# Patient Record
Sex: MALE | NOT HISPANIC OR LATINO | URBAN - METROPOLITAN AREA
[De-identification: names, ages, dates, MRNs, and addresses within clinical notes are randomized per-mention and may not be internally consistent; named-entity substitution may affect disease eponyms.]

---

## 2017-10-07 ENCOUNTER — INPATIENT (INPATIENT)
Facility: HOSPITAL | Age: 52
LOS: 5 days | Discharge: HOME HEALTH | End: 2017-10-13
Attending: HOSPITALIST | Admitting: HOSPITALIST
Payer: COMMERCIAL

## 2017-10-07 VITALS
HEART RATE: 76 BPM | OXYGEN SATURATION: 100 % | TEMPERATURE: 98 F | SYSTOLIC BLOOD PRESSURE: 156 MMHG | RESPIRATION RATE: 18 BRPM | DIASTOLIC BLOOD PRESSURE: 99 MMHG

## 2017-10-07 RX ORDER — MORPHINE SULFATE 50 MG/1
8 CAPSULE, EXTENDED RELEASE ORAL ONCE
Qty: 0 | Refills: 0 | Status: DISCONTINUED | OUTPATIENT
Start: 2017-10-07 | End: 2017-10-07

## 2017-10-07 RX ADMIN — MORPHINE SULFATE 8 MILLIGRAM(S): 50 CAPSULE, EXTENDED RELEASE ORAL at 23:12

## 2017-10-07 NOTE — ED PROVIDER NOTE - OBJECTIVE STATEMENT
This patient is a 52y male w PMHx RA presenting with right wrist swelling, erythema, and pain for the last 2 days. He received a steroid injection on 3 days ago from his rheumatologist. The following day the wrist began to be slightly painful, and since then has become swollen, red, warm, and increasingly painful. He has significantly decreased ROM in both flexion and extension at the wrist. He reports chills at home, no fevers, no n/v/d, headache, no belly pain. This patient is a 52y male w PMHx RA presenting with right wrist swelling, erythema, and pain for the last 2 days. He received a steroid injection on 3 days ago from his rheumatologist. The following day the wrist began to be slightly painful, and since then has become swollen, red, warm, and increasingly painful. He has significantly decreased ROM in both flexion and extension at the wrist. He reports chills at home, no fevers, no n/v/d, headache, no belly pain.    Phil att: 52M h/o RA This patient is a 52y male w PMHx RA presenting with right wrist swelling, erythema, and pain for the last 2 days. He received a steroid injection on 3 days ago from his rheumatologist. The following day the wrist began to be slightly painful, and since then has become swollen, red, warm, and increasingly painful. He has significantly decreased ROM in both flexion and extension at the wrist. He reports chills at home, no fevers, no n/v/d, headache, no belly pain.    00:00 Phil att: 52M h/o HTN, RA 3 days s/p rt wrist injection c/o rt wrist swelling and pain. Patient has h/o RA. Three days ago rec'd cortisol inject to right wrist. Two days ago gradual onset site pain, swelling, redness. Denies objective fever, chills or nausea. Denies abx. Flexes wrist with pain. NKDA PMH as above PSH x MED bp meds PCP Jarad in [place]

## 2017-10-07 NOTE — ED PROVIDER NOTE - MEDICAL DECISION MAKING DETAILS
Rt wrist pain, arthrocentesis attempted neg fluid for aspiration. DDX gout versus early septic arthritis PLAN hand c/s, cbc, cmp, case d/w Hand sx. Hand request CDU for steroids, abx, and will see patient in AM

## 2017-10-07 NOTE — ED ADULT TRIAGE NOTE - CHIEF COMPLAINT QUOTE
Pt walk in c/o Pain on right Wrist with Swelling Redness started today approx. 4pm. Pt Dx with Psoriatic Arthritis last Steroid shot was Wednesday. Called his Rheumatologist to go to ED for further eval. Did not take any pain medication claimed they don't work. Pt looks uncomfortable in Triage.

## 2017-10-07 NOTE — ED PROVIDER NOTE - ATTENDING CONTRIBUTION TO CARE
Dr. Villarreal: I have personally seen and examined this patient at the bedside. I have fully participated in the care of this patient. I have reviewed all pertinent clinical information, including history, physical exam, plan and the Resident's note and agree except as noted. HPI above as by me. PE above as by me. Rt wrist pain, arthrocentesis attempted neg fluid for aspiration. DDX gout versus early septic arthritis PLAN hand c/s, cbc, cmp, case d/w Hand sx. Hand request CDU for steroids, abx, and will see patient in AM.

## 2017-10-07 NOTE — ED ADULT NURSE NOTE - OBJECTIVE STATEMENT
Pt rec'd A&Ox3 c/o rt wrist pain started today @ 3pm. Strong b/l radial pulse. Erythema , warmth, and swelling observed on rt hand on exam. Pt has Hx of RA. States takes methotrexate for RA and receives steroid injections. IV placed LAC #20 g

## 2017-10-07 NOTE — ED PROVIDER NOTE - PROGRESS NOTE DETAILS
Phil att: Arthrocentesis attempted, neg fluid for aspiration. Case d/w Hand. Hand Surgery request CDU.

## 2017-10-07 NOTE — ED PROVIDER NOTE - PHYSICAL EXAMINATION
R wrist edematous, erythematous, warm to touch, and very tender to palpation. Loss of ROM at wrist. R wrist edematous, erythematous, warm to touch, and very tender to palpation. Loss of ROM at wrist.  01:11 Villarreal att: nad, aaox3, ctabl, rrr, s1s2, abd soft ntnd. Rt wrist: dorsal 2cm x 2cm swelling, neg warmth, active ROM limited by pain, passive ROM intact. Cap refill intact x 5 digits, sensation intact x 5 digits.

## 2017-10-08 DIAGNOSIS — I10 ESSENTIAL (PRIMARY) HYPERTENSION: ICD-10-CM

## 2017-10-08 DIAGNOSIS — A41.9 SEPSIS, UNSPECIFIED ORGANISM: ICD-10-CM

## 2017-10-08 DIAGNOSIS — L40.50 ARTHROPATHIC PSORIASIS, UNSPECIFIED: ICD-10-CM

## 2017-10-08 DIAGNOSIS — Z29.9 ENCOUNTER FOR PROPHYLACTIC MEASURES, UNSPECIFIED: ICD-10-CM

## 2017-10-08 DIAGNOSIS — M25.531 PAIN IN RIGHT WRIST: ICD-10-CM

## 2017-10-08 LAB
ALBUMIN SERPL ELPH-MCNC: 4.2 G/DL — SIGNIFICANT CHANGE UP (ref 3.3–5)
ALP SERPL-CCNC: 80 U/L — SIGNIFICANT CHANGE UP (ref 40–120)
ALT FLD-CCNC: 32 U/L — SIGNIFICANT CHANGE UP (ref 4–41)
ANISOCYTOSIS BLD QL: SLIGHT — SIGNIFICANT CHANGE UP
AST SERPL-CCNC: 23 U/L — SIGNIFICANT CHANGE UP (ref 4–40)
BASOPHILS # BLD AUTO: 0.03 K/UL — SIGNIFICANT CHANGE UP (ref 0–0.2)
BASOPHILS # BLD AUTO: 0.05 K/UL — SIGNIFICANT CHANGE UP (ref 0–0.2)
BASOPHILS NFR BLD AUTO: 0.1 % — SIGNIFICANT CHANGE UP (ref 0–2)
BASOPHILS NFR BLD AUTO: 0.2 % — SIGNIFICANT CHANGE UP (ref 0–2)
BASOPHILS NFR SPEC: 0 % — SIGNIFICANT CHANGE UP (ref 0–2)
BILIRUB SERPL-MCNC: 0.2 MG/DL — SIGNIFICANT CHANGE UP (ref 0.2–1.2)
BUN SERPL-MCNC: 16 MG/DL — SIGNIFICANT CHANGE UP (ref 7–23)
BUN SERPL-MCNC: 17 MG/DL — SIGNIFICANT CHANGE UP (ref 7–23)
BUN SERPL-MCNC: 22 MG/DL — SIGNIFICANT CHANGE UP (ref 7–23)
CALCIUM SERPL-MCNC: 8.9 MG/DL — SIGNIFICANT CHANGE UP (ref 8.4–10.5)
CALCIUM SERPL-MCNC: 9.1 MG/DL — SIGNIFICANT CHANGE UP (ref 8.4–10.5)
CALCIUM SERPL-MCNC: 9.3 MG/DL — SIGNIFICANT CHANGE UP (ref 8.4–10.5)
CHLORIDE SERPL-SCNC: 100 MMOL/L — SIGNIFICANT CHANGE UP (ref 98–107)
CHLORIDE SERPL-SCNC: 101 MMOL/L — SIGNIFICANT CHANGE UP (ref 98–107)
CHLORIDE SERPL-SCNC: 99 MMOL/L — SIGNIFICANT CHANGE UP (ref 98–107)
CO2 SERPL-SCNC: 21 MMOL/L — LOW (ref 22–31)
CO2 SERPL-SCNC: 22 MMOL/L — SIGNIFICANT CHANGE UP (ref 22–31)
CO2 SERPL-SCNC: 23 MMOL/L — SIGNIFICANT CHANGE UP (ref 22–31)
CREAT SERPL-MCNC: 0.98 MG/DL — SIGNIFICANT CHANGE UP (ref 0.5–1.3)
CREAT SERPL-MCNC: 0.99 MG/DL — SIGNIFICANT CHANGE UP (ref 0.5–1.3)
CREAT SERPL-MCNC: 1.01 MG/DL — SIGNIFICANT CHANGE UP (ref 0.5–1.3)
CRP SERPL-MCNC: 39.4 MG/L — HIGH
EOSINOPHIL # BLD AUTO: 0 K/UL — SIGNIFICANT CHANGE UP (ref 0–0.5)
EOSINOPHIL # BLD AUTO: 0.03 K/UL — SIGNIFICANT CHANGE UP (ref 0–0.5)
EOSINOPHIL NFR BLD AUTO: 0 % — SIGNIFICANT CHANGE UP (ref 0–6)
EOSINOPHIL NFR BLD AUTO: 0.1 % — SIGNIFICANT CHANGE UP (ref 0–6)
EOSINOPHIL NFR FLD: 0 % — SIGNIFICANT CHANGE UP (ref 0–6)
ERYTHROCYTE [SEDIMENTATION RATE] IN BLOOD: 8 MM/HR — SIGNIFICANT CHANGE UP (ref 1–15)
GIANT PLATELETS BLD QL SMEAR: PRESENT — SIGNIFICANT CHANGE UP
GLUCOSE SERPL-MCNC: 134 MG/DL — HIGH (ref 70–99)
GLUCOSE SERPL-MCNC: 181 MG/DL — HIGH (ref 70–99)
GLUCOSE SERPL-MCNC: 207 MG/DL — HIGH (ref 70–99)
HBA1C BLD-MCNC: 6.3 % — HIGH (ref 4–5.6)
HCT VFR BLD CALC: 43.3 % — SIGNIFICANT CHANGE UP (ref 39–50)
HCT VFR BLD CALC: 44.8 % — SIGNIFICANT CHANGE UP (ref 39–50)
HGB BLD-MCNC: 13.8 G/DL — SIGNIFICANT CHANGE UP (ref 13–17)
HGB BLD-MCNC: 14.7 G/DL — SIGNIFICANT CHANGE UP (ref 13–17)
IMM GRANULOCYTES # BLD AUTO: 0.17 # — SIGNIFICANT CHANGE UP
IMM GRANULOCYTES # BLD AUTO: 0.19 # — SIGNIFICANT CHANGE UP
IMM GRANULOCYTES NFR BLD AUTO: 0.7 % — SIGNIFICANT CHANGE UP (ref 0–1.5)
IMM GRANULOCYTES NFR BLD AUTO: 0.8 % — SIGNIFICANT CHANGE UP (ref 0–1.5)
LYMPHOCYTES # BLD AUTO: 0.77 K/UL — LOW (ref 1–3.3)
LYMPHOCYTES # BLD AUTO: 1.78 K/UL — SIGNIFICANT CHANGE UP (ref 1–3.3)
LYMPHOCYTES # BLD AUTO: 3.3 % — LOW (ref 13–44)
LYMPHOCYTES # BLD AUTO: 7.9 % — LOW (ref 13–44)
LYMPHOCYTES NFR SPEC AUTO: 7.3 % — LOW (ref 13–44)
MACROCYTES BLD QL: SLIGHT — SIGNIFICANT CHANGE UP
MCHC RBC-ENTMCNC: 24.1 PG — LOW (ref 27–34)
MCHC RBC-ENTMCNC: 24.9 PG — LOW (ref 27–34)
MCHC RBC-ENTMCNC: 31.9 % — LOW (ref 32–36)
MCHC RBC-ENTMCNC: 32.8 % — SIGNIFICANT CHANGE UP (ref 32–36)
MCV RBC AUTO: 75.7 FL — LOW (ref 80–100)
MCV RBC AUTO: 75.8 FL — LOW (ref 80–100)
MICROCYTES BLD QL: SLIGHT — SIGNIFICANT CHANGE UP
MONOCYTES # BLD AUTO: 0.29 K/UL — SIGNIFICANT CHANGE UP (ref 0–0.9)
MONOCYTES # BLD AUTO: 1.74 K/UL — HIGH (ref 0–0.9)
MONOCYTES NFR BLD AUTO: 1.2 % — LOW (ref 2–14)
MONOCYTES NFR BLD AUTO: 7.7 % — SIGNIFICANT CHANGE UP (ref 2–14)
MONOCYTES NFR BLD: 1.8 % — LOW (ref 2–9)
NEUTROPHIL AB SER-ACNC: 89.1 % — HIGH (ref 43–77)
NEUTROPHILS # BLD AUTO: 18.75 K/UL — HIGH (ref 1.8–7.4)
NEUTROPHILS # BLD AUTO: 22.15 K/UL — HIGH (ref 1.8–7.4)
NEUTROPHILS NFR BLD AUTO: 83.3 % — HIGH (ref 43–77)
NEUTROPHILS NFR BLD AUTO: 94.7 % — HIGH (ref 43–77)
NRBC # FLD: 0 — SIGNIFICANT CHANGE UP
NRBC # FLD: 0 — SIGNIFICANT CHANGE UP
PLATELET # BLD AUTO: 366 K/UL — SIGNIFICANT CHANGE UP (ref 150–400)
PLATELET # BLD AUTO: 392 K/UL — SIGNIFICANT CHANGE UP (ref 150–400)
PLATELET COUNT - ESTIMATE: NORMAL — SIGNIFICANT CHANGE UP
PMV BLD: 9.4 FL — SIGNIFICANT CHANGE UP (ref 7–13)
PMV BLD: 9.8 FL — SIGNIFICANT CHANGE UP (ref 7–13)
POLYCHROMASIA BLD QL SMEAR: SLIGHT — SIGNIFICANT CHANGE UP
POTASSIUM SERPL-MCNC: 4.7 MMOL/L — SIGNIFICANT CHANGE UP (ref 3.5–5.3)
POTASSIUM SERPL-MCNC: 5.1 MMOL/L — SIGNIFICANT CHANGE UP (ref 3.5–5.3)
POTASSIUM SERPL-MCNC: 5.4 MMOL/L — HIGH (ref 3.5–5.3)
POTASSIUM SERPL-SCNC: 4.7 MMOL/L — SIGNIFICANT CHANGE UP (ref 3.5–5.3)
POTASSIUM SERPL-SCNC: 5.1 MMOL/L — SIGNIFICANT CHANGE UP (ref 3.5–5.3)
POTASSIUM SERPL-SCNC: 5.4 MMOL/L — HIGH (ref 3.5–5.3)
PROT SERPL-MCNC: 7.3 G/DL — SIGNIFICANT CHANGE UP (ref 6–8.3)
RBC # BLD: 5.72 M/UL — SIGNIFICANT CHANGE UP (ref 4.2–5.8)
RBC # BLD: 5.91 M/UL — HIGH (ref 4.2–5.8)
RBC # FLD: 16.9 % — HIGH (ref 10.3–14.5)
RBC # FLD: 17 % — HIGH (ref 10.3–14.5)
SODIUM SERPL-SCNC: 134 MMOL/L — LOW (ref 135–145)
SODIUM SERPL-SCNC: 137 MMOL/L — SIGNIFICANT CHANGE UP (ref 135–145)
SODIUM SERPL-SCNC: 139 MMOL/L — SIGNIFICANT CHANGE UP (ref 135–145)
URATE SERPL-MCNC: 5.9 MG/DL — SIGNIFICANT CHANGE UP (ref 3.4–8.8)
VARIANT LYMPHS # BLD: 1.8 % — SIGNIFICANT CHANGE UP
WBC # BLD: 22.54 K/UL — HIGH (ref 3.8–10.5)
WBC # BLD: 23.41 K/UL — HIGH (ref 3.8–10.5)
WBC # FLD AUTO: 22.54 K/UL — HIGH (ref 3.8–10.5)
WBC # FLD AUTO: 23.41 K/UL — HIGH (ref 3.8–10.5)

## 2017-10-08 PROCEDURE — 73130 X-RAY EXAM OF HAND: CPT | Mod: 26,RT

## 2017-10-08 PROCEDURE — 99223 1ST HOSP IP/OBS HIGH 75: CPT

## 2017-10-08 RX ORDER — ACETAMINOPHEN 500 MG
650 TABLET ORAL EVERY 6 HOURS
Qty: 0 | Refills: 0 | Status: DISCONTINUED | OUTPATIENT
Start: 2017-10-08 | End: 2017-10-13

## 2017-10-08 RX ORDER — KETOROLAC TROMETHAMINE 30 MG/ML
30 SYRINGE (ML) INJECTION ONCE
Qty: 0 | Refills: 0 | Status: DISCONTINUED | OUTPATIENT
Start: 2017-10-08 | End: 2017-10-08

## 2017-10-08 RX ORDER — NICOTINE POLACRILEX 2 MG
1 GUM BUCCAL DAILY
Qty: 0 | Refills: 0 | Status: DISCONTINUED | OUTPATIENT
Start: 2017-10-08 | End: 2017-10-13

## 2017-10-08 RX ORDER — OXYCODONE AND ACETAMINOPHEN 5; 325 MG/1; MG/1
1 TABLET ORAL EVERY 4 HOURS
Qty: 0 | Refills: 0 | Status: DISCONTINUED | OUTPATIENT
Start: 2017-10-08 | End: 2017-10-13

## 2017-10-08 RX ORDER — SODIUM CHLORIDE 9 MG/ML
1000 INJECTION INTRAMUSCULAR; INTRAVENOUS; SUBCUTANEOUS ONCE
Qty: 0 | Refills: 0 | Status: COMPLETED | OUTPATIENT
Start: 2017-10-08 | End: 2017-10-08

## 2017-10-08 RX ORDER — FOLIC ACID 0.8 MG
1 TABLET ORAL DAILY
Qty: 0 | Refills: 0 | Status: DISCONTINUED | OUTPATIENT
Start: 2017-10-08 | End: 2017-10-13

## 2017-10-08 RX ORDER — FLUOCINONIDE/EMOLLIENT BASE 0.05 %
1 CREAM (GRAM) TOPICAL DAILY
Qty: 0 | Refills: 0 | Status: DISCONTINUED | OUTPATIENT
Start: 2017-10-08 | End: 2017-10-13

## 2017-10-08 RX ADMIN — Medication 30 MILLIGRAM(S): at 05:16

## 2017-10-08 RX ADMIN — Medication 100 MILLIGRAM(S): at 01:01

## 2017-10-08 RX ADMIN — OXYCODONE AND ACETAMINOPHEN 1 TABLET(S): 5; 325 TABLET ORAL at 18:30

## 2017-10-08 RX ADMIN — SODIUM CHLORIDE 2000 MILLILITER(S): 9 INJECTION INTRAMUSCULAR; INTRAVENOUS; SUBCUTANEOUS at 01:01

## 2017-10-08 RX ADMIN — Medication 100 MILLIGRAM(S): at 09:31

## 2017-10-08 RX ADMIN — Medication 100 MILLIGRAM(S): at 14:13

## 2017-10-08 RX ADMIN — Medication 10 MILLIGRAM(S): at 07:05

## 2017-10-08 RX ADMIN — OXYCODONE AND ACETAMINOPHEN 1 TABLET(S): 5; 325 TABLET ORAL at 19:00

## 2017-10-08 RX ADMIN — Medication 1 PATCH: at 18:51

## 2017-10-08 RX ADMIN — Medication 30 MILLIGRAM(S): at 04:16

## 2017-10-08 RX ADMIN — Medication 100 MILLIGRAM(S): at 21:50

## 2017-10-08 RX ADMIN — OXYCODONE AND ACETAMINOPHEN 1 TABLET(S): 5; 325 TABLET ORAL at 23:56

## 2017-10-08 RX ADMIN — OXYCODONE AND ACETAMINOPHEN 1 TABLET(S): 5; 325 TABLET ORAL at 23:18

## 2017-10-08 RX ADMIN — Medication 1 MILLIGRAM(S): at 18:29

## 2017-10-08 RX ADMIN — Medication 125 MILLIGRAM(S): at 01:01

## 2017-10-08 NOTE — ED CDU PROVIDER NOTE - MEDICAL DECISION MAKING DETAILS
Rt wrist pain DDX gout, early septic arthritis CDU PLAN steroids qd, abx, pain control, hand surgery eval in AM

## 2017-10-08 NOTE — PROGRESS NOTE ADULT - SUBJECTIVE AND OBJECTIVE BOX
52 year old male with progressive pain swelling and difficulty in ROM right wrist.  HO psoriatic arthritis treated with right wrist steroid injection last week . Over the last 2 days has had progressive symptoms right wrist.  Denies fevers or other systemic signs of infection    Required Morphine and Toradol overnight for pain. States that pain is slightly better today     PMH: Psoriatic arthritis  PSH: None  ALL: None  MEDS: None      Xrays reviewed with changes cw arthritis no acute injuries    Right wrist with isolated dorsal swelling tenderness and erythema over wrist. Difficulty actively  extending fingers, wrist, no volar tenderness active flexion intact      IMP:   No interventions from surgical standpoint  This may represent a localized inflammatory process such as extensor tendon tendinitis or be related to his arthritis  With WBC elevated cannot RO infectious etiology    Recc continuing abx until WBC improves  Add antiinflammatory  Rheumatology evaluation

## 2017-10-08 NOTE — H&P ADULT - ASSESSMENT
51 yo M w/ PMHx HTN, psoriatic arthritis (arthritis dx about 10 yrs ago, psoriasis about 8 yrs ago) on MTX, a/w sepsis 2/2 R hand/wrist cellulitis in the setting of recent steroid injection to the R wrist.

## 2017-10-08 NOTE — H&P ADULT - PROBLEM SELECTOR PLAN 1
2/2 R wrist/hand cellulitis (pt w/ HR > 100 and leukocytosis). Xray R hand reviewed w/ no acute findings. ED attempted to drain area but not fluid to drain.   -c/w IV clindamycin   -would obtain blood cxs (however pt already on abx limiting yield even further)  -monitor CBC   -Hand sx recs appreciated, no surgical intervention at this time  -will consider ID consult if not improving given concern in light that erythema extends over the wrist joint. 2/2 R wrist/hand cellulitis (pt w/ HR > 100 and leukocytosis). Xray R hand reviewed w/ no acute findings. ED attempted to drain area but not fluid to drain.   -c/w IV clindamycin   -would obtain blood cxs (however pt already on abx limiting yield even further)  -monitor CBC   -Hand sx recs appreciated, no surgical intervention at this time  -pain much improved. Pain control w/ tylenol PRN. If tylenol not sufficient can add opiod for better control  -will consider ID consult if not improving given concern in light that erythema extends over the wrist joint.

## 2017-10-08 NOTE — ED CDU PROVIDER NOTE - PROGRESS NOTE DETAILS
CDU ULISSES Prajapati: Pt sleeping comfortably in bed. Given morphine in ED and toradol in cdu for pain. Will sign pt out to day team. Plan: await hand recs, pain control. ULISSES Robles: pt feels well states swelling and pain improved.  Repeat labs work reviewed WBC 23k case d/w hand Dr [phone] agrees with plan to admit for continued antibiotics.  Pt admitted to Dr Tierra CASTRO notified. CAIN MONDRAGON, MD: ACP note I performed a face to face bedside interview with this patient regarding history of present illness, review of symptoms and history.  I completed an independent physical examination.  The examination was documented by myself and I attest to the accuracy of the documentation.  I have signed out the follow up of any pending tests (i.e. labs, radiological studies) to the PA.  I have discussed the patient’s plan of care and disposition with the PA. ADMIT NOTE-CAIN QUESADA MD   Patient is alert and oriented to person, place and date.  Patient is appropriate.  Patient denies any new or worsening physical complaints.  Patient feeling better.  Patient is awake and alert and in no acute distress.  Normocephalic/atraumatic.  Auricles are normal.  Neck supple.  Lungs CTAB, no wheeze, no rhonchi,  no rales.  Heart is regular rate and rhythm.  Back is nontender.  Moving all 4 extremities.   RIGHT hand is erythematous and swollen.  Neurologically grossly intact.  Affect is appropriate. Results of testing discussed with patient at bedside.  Patient understands diagnosis and reason for admission for further evaluation/treatment.  Will admit.

## 2017-10-08 NOTE — H&P ADULT - NSHPLABSRESULTS_GEN_ALL_CORE
.  LABS:                         14.7   23.41 )-----------( 366      ( 08 Oct 2017 07:45 )             44.8     10-08    139  |  101  |  16  ----------------------------<  181<H>  5.4<H>   |  23  |  0.98    Ca    9.1      08 Oct 2017 07:45    TPro  7.3  /  Alb  4.2  /  TBili  0.2  /  DBili  x   /  AST  23  /  ALT  32  /  AlkPhos  80  10-07              RADIOLOGY, EKG & ADDITIONAL TESTS: Reviewed.     EXAM:  RAD HAND 3 VIEW RT        PROCEDURE DATE:  Oct  8 2017         INTERPRETATION:  CLINICAL INFORMATION: Pain.    TECHNIQUE: 3 views of the right hand.    COMPARISON: No available comparisons.    FINDINGS:     There is no acute fracture or dislocation of the right hand. The joint   spaces are maintained. There is soft tissue swelling with tiny locules of   air overlying the dorsum of the wrist and metacarpal bones. No radiopaque   foreign body visualized.    IMPRESSION:     No acute fracture or dislocation.    Soft tissue swelling with tiny locules of air overlying the dorsum of the   wrist and metacarpal bones.

## 2017-10-08 NOTE — H&P ADULT - PROBLEM SELECTOR PLAN 2
Pt states that he has been stable on MTX for many years. He uses topical ointment PRN but tries not to use it too often. His arthritis involves the finger joints but mostly his b/l wrist and he obtains steroid injection every couple of months to that area.   -c/w home MTX. Pt does not know remember the specific dose and states that he takes it every Tuesday. Last dose was on Tuesda. Would clarify home dose w/ pharmacy or Rheumatologist  -c/w folic acid  -PRN topical fluocinonide( therapeutic interchange for pts home halobetasol ointment )  -will contact outpatient Rheumatologist tomorrow to update on current admission Pt states that he has been stable on MTX for many years. He uses topical ointment PRN but tries not to use it too often. His arthritis involves the finger joints but mostly his b/l wrist and he obtains steroid injection every couple of months to that area.   -c/w home MTX. Pt does not know remember the specific dose and states that he takes it every Tuesday. Last dose was on Tuesda. Would clarify home dose w/ pharmacy or Rheumatologist  -c/w folic acid  -PRN topical fluocinonide( therapeutic interchange for pts home halobetasol ointment )  -will contact outpatient Rheumatologist tomorrow to update on current admission  -pt s/p IV solumedrol in the ED. Pt has received steroid joint injection and, on and off topical steroids PRN, but he reports never taking systemic oral steroids. Even though local, and topical steroids can have some systemic absorption, would hold off on stress dose steroids for now. If pt becomes hemodynamically unstable would reconsider.

## 2017-10-08 NOTE — ED CDU PROVIDER NOTE - ATTENDING CONTRIBUTION TO CARE
Dr. Villarreal: I performed a face to face bedside interview with patient regarding history of present illness, review of symptoms and past medical history. I completed an independent physical exam.  I have discussed patient's plan of care with PA.   I agree with note as stated above, having amended the EMR as needed to reflect my findings.   This includes HISTORY OF PRESENT ILLNESS, HIV, PAST MEDICAL/SURGICAL/FAMILY/SOCIAL HISTORY, ALLERGIES AND HOME MEDICATIONS, REVIEW OF SYSTEMS, PHYSICAL EXAM, and any PROGRESS NOTES during the time I functioned as the attending physician for this patient. HPI above as by me. PE above as by me. Rt wrist pain DDX gout, early septic arthritis CDU PLAN steroids qd, abx, pain control, hand surgery eval in AM.

## 2017-10-08 NOTE — H&P ADULT - NSHPREVIEWOFSYSTEMS_GEN_ALL_CORE
REVIEW OF SYSTEMS:    CONSTITUTIONAL: No fever, weight loss, or fatigue  EYES: No eye pain, visual disturbances, or discharge  ENMT:  No difficulty hearing, tinnitus, vertigo; No sinus or throat pain  NECK: No pain or stiffness   RESPIRATORY: No cough, wheezing, chills or hemoptysis; No shortness of breath  CARDIOVASCULAR: No chest pain, palpitations, dizziness, or leg swelling  GASTROINTESTINAL: No abdominal or epigastric pain. No nausea, vomiting, or hematemesis; No diarrhea or constipation. No melena or hematochezia.  GENITOURINARY: No dysuria, frequency, hematuria, or incontinence  NEUROLOGICAL: No headaches, memory loss, loss of strength, numbness, or tremors  SKIN: No itching, burning. Usually has psoriatic rash on L knee, arm, chest, umbilicus and back which patient states has been stable.   LYMPH NODES: No enlarged glands  ENDOCRINE: No heat or cold intolerance; No hair loss  MUSCULOSKELETAL: No muscle, back, or extremity pain. R wrist/arm pain see HPI   PSYCHIATRIC: No depression, anxiety, mood swings, or difficulty sleeping  HEME/LYMPH: No easy bruising, or bleeding gums  ALLERY AND IMMUNOLOGIC: No hives or eczema

## 2017-10-08 NOTE — H&P ADULT - NSHPPHYSICALEXAM_GEN_ALL_CORE
PHYSICAL EXAM:  Constitutional: NAD  Eyes: PERRL, EOMI, no scleral icterus or injection  ENMT: supple, no lymphadenopathy, no palpable mass  Respiratory: CTA b/l , no wheezing, or ronchi  Cardiovascular: RRR, +S1/S2, no murmur appreciated   BACK: Three psoriatic skin patches, no CVA or spinal/paraspinal tenderness   Gastrointestinal: Soft, NT/ND, +BS   Extremities: Warm and well perfused   Vascular: +DP/PT pulses   Neurological: AX0x3, non focal   Skin: + psoriatic rash on back chest, umbilical area, L knee, and arm. Right hand w/ swelling, erythema, and warmth, erythema extends over the wrist joint. No purulent discharge. + TTP  MSK- arthritis deformation of some PIP joints b/l   Psychiatric: Normal mood and affect

## 2017-10-08 NOTE — ED CDU PROVIDER NOTE - OBJECTIVE STATEMENT
00:00 Phil att: 52M h/o HTN, RA 3 days s/p rt wrist injection c/o rt wrist swelling and pain. Patient has h/o RA. Three days ago rec'd cortisol inject to right wrist. Two days ago gradual onset site pain, swelling, redness. Denies objective fever, chills or nausea. Denies abx. Flexes wrist with pain. Main ED arthrocentesis attempted, neg fluid for aspiration. Case d/w Hand. Hand Surgery request CDU for steroids, pain control, and hand surgery eval in AM. NKDA PMH as above PSH x MED bp meds PCP Jarad in [place] 00:00 Phil att: 52M h/o HTN, RA 3 days s/p rt wrist injection c/o rt wrist swelling and pain. Patient has h/o RA. Three days ago rec'd cortisol inject to right wrist. Two days ago gradual onset site pain, swelling, redness. Denies objective fever, chills or nausea. Denies abx. Flexes wrist with pain. Main ED arthrocentesis attempted, neg fluid for aspiration. Case d/w Hand. Hand Surgery request CDU for steroids, pain control, and hand surgery eval in AM. NKDA PMH as above PSH x MED bp meds PCP Abraham in [place].    CDU Provider: Agree with above note. 51 yo male with PMHx of RA, HTN on enalapril presented to ED with hand and wrist pain and swelling s/p steriod injection 3 days ago. Pt noticed hand was becoming more painful, erythematous and swollen x 2day. Denies fever, chills, nausea, vomiting. Denies other complaints. Pt sent to CDU for pain control, steroids abx, hand eval in am. + smoker.

## 2017-10-08 NOTE — H&P ADULT - HISTORY OF PRESENT ILLNESS
53 yo M w/ PMHx HTN, psoriatic arthritis (arthritis dx about 10 yrs ago, psoriasis about 8 yrs ago) on MTX, p/w R wrist/hand, swelling and erythema. Per patient he was in his usual state of health and yesterday around 4pm he noticed his R hand became swollen with increasing pain. He describes the pain as throbbing, constant, no radiation, 10/10 at its worst. Worse with movement. He denies recent trauma to hand, fevers, chills, animal bite/or animal licking hand, denies tick/ insect bite.  Patient then noticed that the area was becoming more erythematous with limitations to hand movement 2/2 pain which prompted his ED visit. Of note, he received steroid injection to R wrist by his rheumatologist on 10/4/17. He states that he gets wrist steroid injections about every couple of months over the past couple of years. This year he notes obtaining wrist injection in August June and April.     In ED: 99.4 92-97 135-142/77-96 16 100 on RA  Started on clindamycin 900mg q8hrs, Given solumedrol 125mg x1, morphine 8mg IV, and ketorolac 30mg IV, 1L NS bolus

## 2017-10-08 NOTE — H&P ADULT - NSHPSOCIALHISTORY_GEN_ALL_CORE
Lives at home with wife and 3 children. Works as an L[employer]. Current smoker about 1/2 PPD for 20 yrs. Occasional ETOH use. Denies use of illicit drugs.

## 2017-10-08 NOTE — ED CDU PROVIDER NOTE - MUSCULOSKELETAL MINIMAL EXAM
Rt wrist active rom limited by pain. Rt wrist passive rom intact. Pulses intact, cap refill intact, sensation intact. Pain with wrist extension and finger flexion./RANGE OF MOTION LIMITED

## 2017-10-09 LAB
BASOPHILS # BLD AUTO: 0.04 K/UL — SIGNIFICANT CHANGE UP (ref 0–0.2)
BASOPHILS NFR BLD AUTO: 0.2 % — SIGNIFICANT CHANGE UP (ref 0–2)
BUN SERPL-MCNC: 18 MG/DL — SIGNIFICANT CHANGE UP (ref 7–23)
CALCIUM SERPL-MCNC: 8.6 MG/DL — SIGNIFICANT CHANGE UP (ref 8.4–10.5)
CHLORIDE SERPL-SCNC: 100 MMOL/L — SIGNIFICANT CHANGE UP (ref 98–107)
CO2 SERPL-SCNC: 22 MMOL/L — SIGNIFICANT CHANGE UP (ref 22–31)
CREAT SERPL-MCNC: 0.9 MG/DL — SIGNIFICANT CHANGE UP (ref 0.5–1.3)
EOSINOPHIL # BLD AUTO: 0.03 K/UL — SIGNIFICANT CHANGE UP (ref 0–0.5)
EOSINOPHIL NFR BLD AUTO: 0.1 % — SIGNIFICANT CHANGE UP (ref 0–6)
GLUCOSE SERPL-MCNC: 132 MG/DL — HIGH (ref 70–99)
HCT VFR BLD CALC: 44 % — SIGNIFICANT CHANGE UP (ref 39–50)
HGB BLD-MCNC: 14.1 G/DL — SIGNIFICANT CHANGE UP (ref 13–17)
HIV 1+2 AB+HIV1 P24 AG SERPL QL IA: SIGNIFICANT CHANGE UP
IMM GRANULOCYTES # BLD AUTO: 0.18 # — SIGNIFICANT CHANGE UP
IMM GRANULOCYTES NFR BLD AUTO: 0.9 % — SIGNIFICANT CHANGE UP (ref 0–1.5)
LYMPHOCYTES # BLD AUTO: 11.9 % — LOW (ref 13–44)
LYMPHOCYTES # BLD AUTO: 2.42 K/UL — SIGNIFICANT CHANGE UP (ref 1–3.3)
MCHC RBC-ENTMCNC: 24.2 PG — LOW (ref 27–34)
MCHC RBC-ENTMCNC: 32 % — SIGNIFICANT CHANGE UP (ref 32–36)
MCV RBC AUTO: 75.6 FL — LOW (ref 80–100)
MONOCYTES # BLD AUTO: 1.89 K/UL — HIGH (ref 0–0.9)
MONOCYTES NFR BLD AUTO: 9.3 % — SIGNIFICANT CHANGE UP (ref 2–14)
NEUTROPHILS # BLD AUTO: 15.86 K/UL — HIGH (ref 1.8–7.4)
NEUTROPHILS NFR BLD AUTO: 77.6 % — HIGH (ref 43–77)
NRBC # FLD: 0 — SIGNIFICANT CHANGE UP
PLATELET # BLD AUTO: 368 K/UL — SIGNIFICANT CHANGE UP (ref 150–400)
PMV BLD: 9.7 FL — SIGNIFICANT CHANGE UP (ref 7–13)
POTASSIUM SERPL-MCNC: 4.2 MMOL/L — SIGNIFICANT CHANGE UP (ref 3.5–5.3)
POTASSIUM SERPL-SCNC: 4.2 MMOL/L — SIGNIFICANT CHANGE UP (ref 3.5–5.3)
RBC # BLD: 5.82 M/UL — HIGH (ref 4.2–5.8)
RBC # FLD: 17.2 % — HIGH (ref 10.3–14.5)
SODIUM SERPL-SCNC: 138 MMOL/L — SIGNIFICANT CHANGE UP (ref 135–145)
SPECIMEN SOURCE: SIGNIFICANT CHANGE UP
SPECIMEN SOURCE: SIGNIFICANT CHANGE UP
WBC # BLD: 20.42 K/UL — HIGH (ref 3.8–10.5)
WBC # FLD AUTO: 20.42 K/UL — HIGH (ref 3.8–10.5)

## 2017-10-09 PROCEDURE — 99222 1ST HOSP IP/OBS MODERATE 55: CPT | Mod: GC

## 2017-10-09 PROCEDURE — 99233 SBSQ HOSP IP/OBS HIGH 50: CPT

## 2017-10-09 RX ADMIN — Medication 10 MILLIGRAM(S): at 05:23

## 2017-10-09 RX ADMIN — OXYCODONE AND ACETAMINOPHEN 1 TABLET(S): 5; 325 TABLET ORAL at 22:17

## 2017-10-09 RX ADMIN — OXYCODONE AND ACETAMINOPHEN 1 TABLET(S): 5; 325 TABLET ORAL at 21:29

## 2017-10-09 RX ADMIN — OXYCODONE AND ACETAMINOPHEN 1 TABLET(S): 5; 325 TABLET ORAL at 13:50

## 2017-10-09 RX ADMIN — Medication 1 PATCH: at 17:26

## 2017-10-09 RX ADMIN — OXYCODONE AND ACETAMINOPHEN 1 TABLET(S): 5; 325 TABLET ORAL at 18:25

## 2017-10-09 RX ADMIN — Medication 1 MILLIGRAM(S): at 13:57

## 2017-10-09 RX ADMIN — OXYCODONE AND ACETAMINOPHEN 1 TABLET(S): 5; 325 TABLET ORAL at 12:50

## 2017-10-09 RX ADMIN — OXYCODONE AND ACETAMINOPHEN 1 TABLET(S): 5; 325 TABLET ORAL at 17:24

## 2017-10-09 RX ADMIN — Medication 100 MILLIGRAM(S): at 13:56

## 2017-10-09 RX ADMIN — Medication 100 MILLIGRAM(S): at 21:31

## 2017-10-09 RX ADMIN — Medication 100 MILLIGRAM(S): at 05:23

## 2017-10-09 RX ADMIN — Medication 1 PATCH: at 17:24

## 2017-10-09 NOTE — PROGRESS NOTE ADULT - SUBJECTIVE AND OBJECTIVE BOX
Patient is a 52y old  Male who presents with a chief complaint of Right wrist pain , swelling, erythema (08 Oct 2017 13:41)      SUBJECTIVE / OVERNIGHT EVENTS:    MEDICATIONS  (STANDING):  clindamycin IVPB 900 milliGRAM(s) IV Intermittent every 8 hours  enalapril 10 milliGRAM(s) Oral daily  folic acid 1 milliGRAM(s) Oral daily  nicotine - 21 mG/24Hr(s) Patch 1 patch Transdermal daily    MEDICATIONS  (PRN):  acetaminophen   Tablet. 650 milliGRAM(s) Oral every 6 hours PRN Moderate Pain (4 - 6)  fluocinonide 0.05% Ointment 1 Application(s) Topical daily PRN Psoriatic Rash  oxyCODONE    5 mG/acetaminophen 325 mG 1 Tablet(s) Oral every 4 hours PRN Severe Pain (7 - 10)    Vital Signs Last 24 Hrs  T(C): 36.7 (09 Oct 2017 13:53), Max: 37.6 (08 Oct 2017 20:10)  T(F): 98 (09 Oct 2017 13:53), Max: 99.7 (08 Oct 2017 20:10)  HR: 100 (09 Oct 2017 13:53) (91 - 100)  BP: 126/63 (09 Oct 2017 13:53) (126/63 - 147/88)  BP(mean): --  RR: 18 (09 Oct 2017 13:53) (17 - 18)  SpO2: 100% (09 Oct 2017 13:53) (98% - 100%)    CAPILLARY BLOOD GLUCOSE        I&O's Summary      PHYSICAL EXAM:  GENERAL: NAD, well-developed  HEAD:  Atraumatic, Normocephalic  EYES: EOMI, PERRLA, conjunctiva and sclera clear  NECK: Supple, No JVD  CHEST/LUNG: Clear to auscultation bilaterally; No wheeze  HEART: Regular rate and rhythm; No murmurs, rubs, or gallops  ABDOMEN: Soft, Nontender, Nondistended; Bowel sounds present  EXTREMITIES:  2+ Peripheral Pulses, No clubbing, cyanosis, or edema  PSYCH: AAOx3    NEUROLOGY: non-focal  SKIN: psoriatic rash on back chest, umbilical area, L knee, and arm.   MSK: Right hand w/ swelling, erythema, and warmth, erythema extends over the wrist joint. No purulent discharge. + TTP  Arthritis deformation of some PIP joints        LABS:                        14.1   20.42 )-----------( 368      ( 09 Oct 2017 05:50 )             44.0     10-09    138  |  100  |  18  ----------------------------<  132<H>  4.2   |  22  |  0.90    Ca    8.6      09 Oct 2017 05:50    TPro  7.3  /  Alb  4.2  /  TBili  0.2  /  DBili  x   /  AST  23  /  ALT  32  /  AlkPhos  80  10-07              RADIOLOGY & ADDITIONAL TESTS:    Imaging Personally Reviewed:    Consultant(s) Notes Reviewed:      Care Discussed with Consultants/Other Providers: Patient is a 52y old  Male who presents with a chief complaint of Right wrist pain , swelling, erythema (08 Oct 2017 13:41)      SUBJECTIVE / OVERNIGHT EVENTS:    MEDICATIONS  (STANDING):  clindamycin IVPB 900 milliGRAM(s) IV Intermittent every 8 hours  enalapril 10 milliGRAM(s) Oral daily  folic acid 1 milliGRAM(s) Oral daily  nicotine - 21 mG/24Hr(s) Patch 1 patch Transdermal daily    MEDICATIONS  (PRN):  acetaminophen   Tablet. 650 milliGRAM(s) Oral every 6 hours PRN Moderate Pain (4 - 6)  fluocinonide 0.05% Ointment 1 Application(s) Topical daily PRN Psoriatic Rash  oxyCODONE    5 mG/acetaminophen 325 mG 1 Tablet(s) Oral every 4 hours PRN Severe Pain (7 - 10)    Vital Signs Last 24 Hrs  T(C): 36.7 (09 Oct 2017 13:53), Max: 37.6 (08 Oct 2017 20:10)  T(F): 98 (09 Oct 2017 13:53), Max: 99.7 (08 Oct 2017 20:10)  HR: 100 (09 Oct 2017 13:53) (91 - 100)  BP: 126/63 (09 Oct 2017 13:53) (126/63 - 147/88)  BP(mean): --  RR: 18 (09 Oct 2017 13:53) (17 - 18)  SpO2: 100% (09 Oct 2017 13:53) (98% - 100%)    CAPILLARY BLOOD GLUCOSE        I&O's Summary      PHYSICAL EXAM:  GENERAL: NAD, well-developed  HEAD:  Atraumatic, Normocephalic  EYES: EOMI, PERRLA, conjunctiva and sclera clear  NECK: Supple, No JVD  CHEST/LUNG: Clear to auscultation bilaterally; No wheeze  HEART: Regular rate and rhythm; No murmurs, rubs, or gallops  ABDOMEN: Soft, Nontender, Nondistended; Bowel sounds present  EXTREMITIES:  2+ Peripheral Pulses, No clubbing, cyanosis, or edema  PSYCH: AAOx3    NEUROLOGY: non-focal  SKIN: psoriatic rash on back chest, umbilical area, L knee, and arm.   MSK: Right hand w/ swelling, erythema, and warmth, erythema extends over the wrist joint. No purulent discharge. + TTP  Arthritis deformation of some PIP joints        LABS:                        14.1   20.42 )-----------( 368      ( 09 Oct 2017 05:50 )             44.0     10-09    138  |  100  |  18  ----------------------------<  132<H>  4.2   |  22  |  0.90    Ca    8.6      09 Oct 2017 05:50    TPro  7.3  /  Alb  4.2  /  TBili  0.2  /  DBili  x   /  AST  23  /  ALT  32  /  AlkPhos  80  10-07              RADIOLOGY & ADDITIONAL TESTS:    Imaging Personally Reviewed:    Consultant(s) Notes Reviewed:      Care Discussed with Consultants/Other Providers: Sx Patient is a 52y old  Male who presents with a chief complaint of Right wrist pain , swelling, erythema (08 Oct 2017 13:41)      SUBJECTIVE / OVERNIGHT EVENTS:pt continues to report pain in his right hand and wrist, worse on movement     MEDICATIONS  (STANDING):  clindamycin IVPB 900 milliGRAM(s) IV Intermittent every 8 hours  enalapril 10 milliGRAM(s) Oral daily  folic acid 1 milliGRAM(s) Oral daily  nicotine - 21 mG/24Hr(s) Patch 1 patch Transdermal daily    MEDICATIONS  (PRN):  acetaminophen   Tablet. 650 milliGRAM(s) Oral every 6 hours PRN Moderate Pain (4 - 6)  fluocinonide 0.05% Ointment 1 Application(s) Topical daily PRN Psoriatic Rash  oxyCODONE    5 mG/acetaminophen 325 mG 1 Tablet(s) Oral every 4 hours PRN Severe Pain (7 - 10)    Vital Signs Last 24 Hrs  T(C): 36.7 (09 Oct 2017 13:53), Max: 37.6 (08 Oct 2017 20:10)  T(F): 98 (09 Oct 2017 13:53), Max: 99.7 (08 Oct 2017 20:10)  HR: 100 (09 Oct 2017 13:53) (91 - 100)  BP: 126/63 (09 Oct 2017 13:53) (126/63 - 147/88)  BP(mean): --  RR: 18 (09 Oct 2017 13:53) (17 - 18)  SpO2: 100% (09 Oct 2017 13:53) (98% - 100%)    CAPILLARY BLOOD GLUCOSE        I&O's Summary      PHYSICAL EXAM:  GENERAL: NAD, well-developed  HEAD:  Atraumatic, Normocephalic  EYES: EOMI, PERRLA, conjunctiva and sclera clear  NECK: Supple, No JVD  CHEST/LUNG: Clear to auscultation bilaterally; No wheeze  HEART: Regular rate and rhythm; No murmurs, rubs, or gallops  ABDOMEN: Soft, Nontender, Nondistended; Bowel sounds present  EXTREMITIES:  2+ Peripheral Pulses, No clubbing, cyanosis, or edema  PSYCH: AAOx3    NEUROLOGY: non-focal  SKIN: psoriatic rash on back chest, umbilical area, L knee, and arm.   MSK: Right hand w/ swelling, erythema, and warmth, erythema extends over the wrist joint. No purulent discharge. + TTP  Arthritis deformation of some PIP joints        LABS:                        14.1   20.42 )-----------( 368      ( 09 Oct 2017 05:50 )             44.0     10-09    138  |  100  |  18  ----------------------------<  132<H>  4.2   |  22  |  0.90    Ca    8.6      09 Oct 2017 05:50    TPro  7.3  /  Alb  4.2  /  TBili  0.2  /  DBili  x   /  AST  23  /  ALT  32  /  AlkPhos  80  10-07              RADIOLOGY & ADDITIONAL TESTS:    Imaging Personally Reviewed:    Consultant(s) Notes Reviewed:      Care Discussed with Consultants/Other Providers: Lou

## 2017-10-09 NOTE — PROGRESS NOTE ADULT - SUBJECTIVE AND OBJECTIVE BOX
Right hand swelling improved  Pain improved    Able to move fingers more    WBC decreased    Cont Abx  Rheum FU  If worsens consider MRI

## 2017-10-09 NOTE — CONSULT NOTE ADULT - SUBJECTIVE AND OBJECTIVE BOX
HPI:  53 yo M w/ PMHx HTN, psoriatic arthritis (arthritis dx about 10 yrs ago, psoriasis about 8 yrs ago) on MTX, p/w R wrist/hand, swelling and erythema.    In the past, the most common site of flairs of his psoriatic arthritis have been his wrists (although ankles/knees have also been effected)    On 10/4- he had a steroid injection to his right wrist- after he had pain typical of a previous flair.    3-4 days later, he noted increasing pain , worse with range of motion to the right wrist.  He denies fever. Pain was initially 10/10.    With pain control it has been better.      He denies recent trauma to hand, fevers, chills, animal bite/or animal licking hand, denies tick/ insect bite.      Had attempted arthrocentesis without fluid.  He has been given clindamycin and pain control      PAST MEDICAL & SURGICAL HISTORY:  Psoriatic arthritis  Essential hypertension  No significant past surgical history      Allergies    No Known Allergies    Intolerances        ANTIMICROBIALS:  clindamycin IVPB 900 every 8 hours      OTHER MEDS:  acetaminophen   Tablet. 650 milliGRAM(s) Oral every 6 hours PRN  enalapril 10 milliGRAM(s) Oral daily  fluocinonide 0.05% Ointment 1 Application(s) Topical daily PRN  folic acid 1 milliGRAM(s) Oral daily  nicotine - 21 mG/24Hr(s) Patch 1 patch Transdermal daily  oxyCODONE    5 mG/acetaminophen 325 mG 1 Tablet(s) Oral every 4 hours PRN      SOCIAL HISTORY: + tobacco, alcohol on weekends    FAMILY HISTORY:  Family history of hypertension  Family history of rheumatoid arthritis      REVIEW OF SYSTEMS  [  ] ROS unobtainable because:    [x  ] All other systems negative except as noted below:	    Constitutional:  [ ] fever [ ] weight loss  Skin:  [ ] rash [ ] phlebitis	  Eyes: [ ] icterus [ ] inflammation	  ENMT: [ ] discharge [ ] thrush [ ] ulcers [ ] exudates  Respiratory: [ ] dyspnea [ ] hemoptysis [ ] cough [ ] sputum	  Cardiovascular:  [ ] chest pain [ ] palpitations [ ] edema	  Gastrointestinal:  [ ] nausea [ ] vomiting [ ] diarrhea [ ] constipation [ ] pain	  Genitourinary:  [ ] dysuria [ ] frequency [ ] hematuria [ ] discharge [ ] flank pain  Musculoskeletal:  [ ] myalgias [x ] arthralgias [ ] arthritis	  Neurological:  [ ] headache [ ] seizures	  Psychiatric:  [ ] anxiety [ ] depression	  Hematology/Lymphatics:  [ ] lymphadenopathy  Endocrine:  [ ] adrenal [ ] thyroid  Allergic/Immunologic:	 [ ] transplant [ ] seasonal    PHYSICAL EXAM:  General: [ x] non-toxic  HEAD/EYES: [ ] PERRL [ ] white sclera [ ] icterus  ENT:  [x ] normal [ ] supple [ ] thrush [ ] pharyngeal exudate  Cardiovascular:   [ ] murmur [ x] normal [ ] PPM/AICD  Respiratory:  [x ] clear to ausculation bilaterally  GI:  [x ] soft, non-tender, normal bowel sounds  :  [ ] mabry [x ] no CVA tenderness   Musculoskeletal:  [ ] right wrist with pain with motion, swollen, red/warm  Neurologic:  [ ] non-focal exam   Skin:  [ ] no rash  Lymph: x[ ] no lymphadenopathy  Psychiatric:  [ ] appropriate affect [x ] alert & oriented  Lines:  [x ] no phlebitis [ ] central line          Drug Dosing Weight  Height (cm): 167.64 (08 Oct 2017 20:10)  Weight (kg): 59.9 (08 Oct 2017 20:10)  BMI (kg/m2): 21.3 (08 Oct 2017 20:10)  BSA (m2): 1.68 (08 Oct 2017 20:10)    Vital Signs Last 24 Hrs  T(F): 98 (10-09-17 @ 13:53), Max: 99.7 (10-08-17 @ 20:10)    Vital Signs Last 24 Hrs  HR: 100 (10-09-17 @ 13:53) (91 - 100)  BP: 126/63 (10-09-17 @ 13:53) (126/63 - 147/88)  RR: 18 (10-09-17 @ 13:53)  SpO2: 100% (10-09-17 @ 13:53) (98% - 100%)  Wt(kg): --                          14.1   20.42 )-----------( 368      ( 09 Oct 2017 05:50 )             44.0       10-09    138  |  100  |  18  ----------------------------<  132<H>  4.2   |  22  |  0.90    Ca    8.6      09 Oct 2017 05:50    TPro  7.3  /  Alb  4.2  /  TBili  0.2  /  DBili  x   /  AST  23  /  ALT  32  /  AlkPhos  80  10-07          MICROBIOLOGY:    RADIOLOGY:

## 2017-10-09 NOTE — PROGRESS NOTE ADULT - PROBLEM SELECTOR PLAN 2
Team dw  Dr. Abraham, private Rheum   Pts dosage is Methotrexate 15 mg q weekly on Tuesdays  Pt states that he has been stable on MTX for many years. He uses topical ointment PRN but tries not to use it too often. His arthritis involves the finger joints but mostly his b/l wrist and he obtains steroid injection every couple of months to that area. C/w home MTX. Pt does not know remember the specific dose and states that he takes it every Tuesday. Last dose was on Tuesday. will continue MTX weekly,c/w folic acid, PRN topical fluocinonide( therapeutic interchange for pts home halobetasol ointment )  s/p IV solumedrol in the ED. Pt has received steroid joint injection and, on and off topical steroids PRN, but he reports never taking systemic oral steroids. Even though local, and topical steroids can have some systemic absorption, would hold off on stress dose steroids for now. If pt becomes hemodynamically unstable would reconsider. Team dw  Dr. Abraham, private Rheum   Pts dosage is Methotrexate 15 mg q weekly on Tuesdays  Pt states that he has been stable on MTX for many years. He uses topical ointment PRN but tries not to use it too often. His arthritis involves the finger joints but mostly his b/l wrist and he obtains steroid injection every couple of months to that area. C/w home MTX.

## 2017-10-09 NOTE — PROGRESS NOTE ADULT - PROBLEM SELECTOR PLAN 4
Mild hyperkalemia to 5.4 that is not hemolyzed  -repeat BMP Mild hyperkalemia to 5.4 that is not hemolyzed- resolved   -repeat BMP

## 2017-10-09 NOTE — CONSULT NOTE ADULT - ASSESSMENT
52 year old with psoriatic arthritis with right wrist pain after a steroid injection.    The differential diagnosis includes a flair of the psoriatic arthritis, gout, or an infected joint after the joint injection.      In the absence of fever, an infected joint does seem less likely.    Continue clindamycin.    Consider an MRI of the wrist    Consider rheum eval.

## 2017-10-10 LAB
BASOPHILS # BLD AUTO: 0.05 K/UL — SIGNIFICANT CHANGE UP (ref 0–0.2)
BASOPHILS NFR BLD AUTO: 0.3 % — SIGNIFICANT CHANGE UP (ref 0–2)
BUN SERPL-MCNC: 17 MG/DL — SIGNIFICANT CHANGE UP (ref 7–23)
CALCIUM SERPL-MCNC: 9.1 MG/DL — SIGNIFICANT CHANGE UP (ref 8.4–10.5)
CHLORIDE SERPL-SCNC: 96 MMOL/L — LOW (ref 98–107)
CO2 SERPL-SCNC: 24 MMOL/L — SIGNIFICANT CHANGE UP (ref 22–31)
CREAT SERPL-MCNC: 0.89 MG/DL — SIGNIFICANT CHANGE UP (ref 0.5–1.3)
EOSINOPHIL # BLD AUTO: 0.15 K/UL — SIGNIFICANT CHANGE UP (ref 0–0.5)
EOSINOPHIL NFR BLD AUTO: 0.9 % — SIGNIFICANT CHANGE UP (ref 0–6)
GLUCOSE SERPL-MCNC: 122 MG/DL — HIGH (ref 70–99)
HCT VFR BLD CALC: 45.7 % — SIGNIFICANT CHANGE UP (ref 39–50)
HGB BLD-MCNC: 14.6 G/DL — SIGNIFICANT CHANGE UP (ref 13–17)
IMM GRANULOCYTES # BLD AUTO: 0.15 # — SIGNIFICANT CHANGE UP
IMM GRANULOCYTES NFR BLD AUTO: 0.9 % — SIGNIFICANT CHANGE UP (ref 0–1.5)
LYMPHOCYTES # BLD AUTO: 15.9 % — SIGNIFICANT CHANGE UP (ref 13–44)
LYMPHOCYTES # BLD AUTO: 2.8 K/UL — SIGNIFICANT CHANGE UP (ref 1–3.3)
MCHC RBC-ENTMCNC: 24.1 PG — LOW (ref 27–34)
MCHC RBC-ENTMCNC: 31.9 % — LOW (ref 32–36)
MCV RBC AUTO: 75.4 FL — LOW (ref 80–100)
MONOCYTES # BLD AUTO: 1.35 K/UL — HIGH (ref 0–0.9)
MONOCYTES NFR BLD AUTO: 7.7 % — SIGNIFICANT CHANGE UP (ref 2–14)
NEUTROPHILS # BLD AUTO: 13.06 K/UL — HIGH (ref 1.8–7.4)
NEUTROPHILS NFR BLD AUTO: 74.3 % — SIGNIFICANT CHANGE UP (ref 43–77)
NRBC # FLD: 0 — SIGNIFICANT CHANGE UP
PLATELET # BLD AUTO: 387 K/UL — SIGNIFICANT CHANGE UP (ref 150–400)
PMV BLD: 9.5 FL — SIGNIFICANT CHANGE UP (ref 7–13)
POTASSIUM SERPL-MCNC: 4.8 MMOL/L — SIGNIFICANT CHANGE UP (ref 3.5–5.3)
POTASSIUM SERPL-SCNC: 4.8 MMOL/L — SIGNIFICANT CHANGE UP (ref 3.5–5.3)
RBC # BLD: 6.06 M/UL — HIGH (ref 4.2–5.8)
RBC # FLD: 17.4 % — HIGH (ref 10.3–14.5)
SODIUM SERPL-SCNC: 135 MMOL/L — SIGNIFICANT CHANGE UP (ref 135–145)
WBC # BLD: 17.56 K/UL — HIGH (ref 3.8–10.5)
WBC # FLD AUTO: 17.56 K/UL — HIGH (ref 3.8–10.5)

## 2017-10-10 PROCEDURE — 99223 1ST HOSP IP/OBS HIGH 75: CPT | Mod: GC

## 2017-10-10 PROCEDURE — 99232 SBSQ HOSP IP/OBS MODERATE 35: CPT

## 2017-10-10 PROCEDURE — 99233 SBSQ HOSP IP/OBS HIGH 50: CPT

## 2017-10-10 RX ADMIN — Medication 100 MILLIGRAM(S): at 05:39

## 2017-10-10 RX ADMIN — OXYCODONE AND ACETAMINOPHEN 1 TABLET(S): 5; 325 TABLET ORAL at 19:00

## 2017-10-10 RX ADMIN — OXYCODONE AND ACETAMINOPHEN 1 TABLET(S): 5; 325 TABLET ORAL at 14:50

## 2017-10-10 RX ADMIN — Medication 10 MILLIGRAM(S): at 05:39

## 2017-10-10 RX ADMIN — OXYCODONE AND ACETAMINOPHEN 1 TABLET(S): 5; 325 TABLET ORAL at 10:40

## 2017-10-10 RX ADMIN — Medication 100 MILLIGRAM(S): at 22:09

## 2017-10-10 RX ADMIN — OXYCODONE AND ACETAMINOPHEN 1 TABLET(S): 5; 325 TABLET ORAL at 18:02

## 2017-10-10 RX ADMIN — OXYCODONE AND ACETAMINOPHEN 1 TABLET(S): 5; 325 TABLET ORAL at 13:52

## 2017-10-10 RX ADMIN — OXYCODONE AND ACETAMINOPHEN 1 TABLET(S): 5; 325 TABLET ORAL at 22:09

## 2017-10-10 RX ADMIN — OXYCODONE AND ACETAMINOPHEN 1 TABLET(S): 5; 325 TABLET ORAL at 22:36

## 2017-10-10 RX ADMIN — OXYCODONE AND ACETAMINOPHEN 1 TABLET(S): 5; 325 TABLET ORAL at 02:28

## 2017-10-10 RX ADMIN — OXYCODONE AND ACETAMINOPHEN 1 TABLET(S): 5; 325 TABLET ORAL at 05:38

## 2017-10-10 RX ADMIN — OXYCODONE AND ACETAMINOPHEN 1 TABLET(S): 5; 325 TABLET ORAL at 09:43

## 2017-10-10 RX ADMIN — Medication 100 MILLIGRAM(S): at 13:52

## 2017-10-10 RX ADMIN — Medication 1 PATCH: at 18:02

## 2017-10-10 RX ADMIN — OXYCODONE AND ACETAMINOPHEN 1 TABLET(S): 5; 325 TABLET ORAL at 01:36

## 2017-10-10 RX ADMIN — Medication 1 MILLIGRAM(S): at 13:52

## 2017-10-10 RX ADMIN — OXYCODONE AND ACETAMINOPHEN 1 TABLET(S): 5; 325 TABLET ORAL at 06:15

## 2017-10-10 RX ADMIN — Medication 1 PATCH: at 18:00

## 2017-10-10 NOTE — PROGRESS NOTE ADULT - ASSESSMENT
52 year old with psoriatic arthritis with right wrist pain after a steroid injection.  Unclear etiology of pain.   The differential diagnosis includes a flair of the psoriatic arthritis, gout, or an infected joint after the joint injection.    In the absence of fever, an infected joint does seem less likely.    Continue clindamycin.    Consider an MRI of the wrist    Consider rheum eval.

## 2017-10-10 NOTE — CONSULT NOTE ADULT - ASSESSMENT
52 y.o male with history of psoriatic arthritis and recent wrist joint space steroid injection (6 days ago) now with 3 days of swelling and erythema most consistent with cellulitis vs monoarthritis. The pain is described as within the joint and exacerbated by movement of the joint suggestive of an intra-articular process (synovitis). This would include septic joint, hemarthrosis, crystal arthropathy. However, a flair of as systemic disease (e.g. psoriatic arthritis or RA) could also present this way. Normal uric acid, ESR, would lower crystal arthropathy on the differential. Unlikely gonococcal (no new sexual partners) or lyme (no tick bites). Given the timing of the recent injection, would strongly favor cellulitis, hemarthrosis, and possible infection introduced to the joint space.     Synovitis:      Likely 2/2 recent injection. Would treat as infected until proven otherwise. Unlikely gout with no uric acid elevation and no history. Psoriatic arthritis flair possible but less likely given atypical for the patients flairs, recent injection. Locules of air on x-ray may suggest presence of bacteria.       - c/w clindamycin for now       - monitor for improvement (improving per previous notes)       - f/u MRI       - Joint fluid analysis would be helpful if worsening 52 y.o male with history of psoriatic arthritis and recent wrist joint space steroid injection (6 days ago) now with 3 days of swelling and erythema most consistent with cellulitis vs monoarthritis. The pain is described as within the joint and exacerbated by movement of the joint suggestive of an intra-articular process (synovitis). This would include septic joint, hemarthrosis, crystal arthropathy. However, a flair of as systemic disease (e.g. psoriatic arthritis or RA) could also present this way. Normal uric acid, ESR, would lower crystal arthropathy on the differential. Unlikely gonococcal (no new sexual partners) or lyme (no tick bites). Given the timing of the recent injection, would strongly favor cellulitis, hemarthrosis, and possible infection introduced to the joint space.     Synovitis:      Likely 2/2 recent injection. Would treat as infected until proven otherwise. Unlikely gout with no uric acid elevation and no history. Psoriatic arthritis flair possible but less likely given atypical for the patients flairs, recent injection. Locules of air on x-ray may suggest presence of bacteria.        - c/w clindamycin for now       - monitor for improvement (improving per previous notes)       - f/u MRI       - Joint fluid analysis would be helpful if worsening       - Consider DESTINEE, CCP 52 y.o male with history of psoriatic arthritis and recent wrist joint space steroid injection (6 days ago) now with 3 days of swelling and erythema most consistent with septic arthritis. The pain is described as within the joint and exacerbated by movement of the joint suggestive of an intra-articular process (synovitis). This would include septic joint, hemarthrosis, crystal arthropathy. However, a flair of as systemic disease (e.g. psoriatic arthritis or RA) could also present this way. Normal uric acid, ESR, would lower crystal arthropathy on the differential. Unlikely gonococcal (no new sexual partners) or lyme (no tick bites). Given the timing of the recent injection, would strongly favor nongonococcal septic joint.     Synovitis:      Likely 2/2 recent injection. Would treat as infected until proven otherwise. Unlikely gout with no uric acid elevation and no history. Psoriatic arthritis flare possible but less likely given atypical for the patients flairs, recent injection. Locules of air on x-ray may suggest presence of bacteria but is more likely tracking from attempt at joint aspiration.       - c/w clindamycin for now       - monitor for improvement (improving per previous notes)       - f/u MRI       - Tylenol for pain          - hold methotrexate

## 2017-10-10 NOTE — CONSULT NOTE ADULT - SUBJECTIVE AND OBJECTIVE BOX
Mr. Weeks is a 52 y.o. male with a history of HTN and psoriatic arthritis on MTX who presented yesterday with right wrist/hand swelling and erythema. He was in his usual state of health until Sunday afternoon when he began to notice increased pain in his right wrist with swelling. He described the pain as a 10/10 throbbing pain without radiation, worsened by flexion and extension of the wrist and alleviated by elevation. Of note, he recently received a wrist joint injection by his rheumatologist (Dr. Abelino Abraham: [phone]), 5 days prior to admission. He receives these injections every 2- 2 1/2 months. He denies any recent trauma, travel, tick/insect/animal bites ("[He is] an indoors hamida"). He denied fever and chills.    He was first diagnosed with psoriasis arthritis 10 years ago. His first manifestation was arthritis. He had then developed scalp involvement. He was started on MTX ~ 8 years ago. The disease has progressed slowly over that period of time. He states that the arthritis has mostly involved his wrists but has involved ankles and knees on previous occasions. He denies ever having a flair with wrist at the same severity as this presentation. He report psoriatic plaques on his back, left knee, left wrist, right AC fossa, and his sacrum. He reports worsening farsightedness but no other occular symptoms. He denies edema, dysuria, and new sexual partners.      He denies any history of gout.    Hospital Course:  In the ED T 99.4  HR 90s -140s/70-90s RR 16 SpO2 100 on RA  He was started on Clindamycin 900 mg q8hrs, Solumedrol 125 mg x1, Morphine 8 mg IV, Ketorolac 300 mg IV, 1 L Bolus  He was continued on Clindamycin and Percocet.  Fluocinonide (for psoriatic rash), folic acid, and enalapril were also continued.  An attempt was made to obtain joint fluid to no avail.  Hand surgery and infectious diseases were consulted.  Per surgery: No surgical intervention. would r/o infectious etiology and get rheumatology evaluation  Per ID: Unlikely infected joint, continue with clinda, get rhuem eval  An MRI of the wrist was ordered.    ROS: Negative except as in HPI  PMH: HTN, psoriatic arthritis  PSH: None reported  Family: Brother with HTN and rheumatoid arthritis, Mother with history of RA, HTN, and CABG x3  Social: 1/2 ppd for 30 years, 4-5 drinks / day only on weekends, No illicit. 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 No edema  Neurological: Alert and oriented x3. Non-focal  Skin:        Psoriatic Rash:              Located on back x3 lessions (one central, one left superior, one right inferior).             Right antecubital fossa laterally, Left wrist medially, Left knee, Low thorax over xyphoid and low sternum, periumbilical       Erythema of the dorsum of the right extending from the wrist to the MCP joints. Previously marked extend in the ED, receded from that pen line.       No Tophi appreciated at ears or elbows  MSK: Right hand with tenderness to wrist palpation. Wrist is warm and there is swelling of the dorsum of the hand with effacement of the tendons compared to the left hand. Unable to extend or flex at wrist from neutral position. Fingers unaffected. No synovititis appreciated in the MCP, PIP, or DIP joints.      Deformity of the left third DIP joint secondary to old sport injury.   Psychiatric: Normal mood and affect    Labs:                        14.6   17.56 )-----------( 387      ( 10 Oct 2017 06:30 )             45.7     10-10    135  |  96<L>  |  17  ----------------------------<  122<H>  4.8   |  24  |  0.89    Ca    9.1      10 Oct 2017 06:30    Uric Acid, Serum (10.07.17 @ 23:10):                             5.9 mg/dL  Sedimentation Rate, Erythrocyte (10.08.17 @ 07:45):       8 mm/hr  C-Reactive Protein, Serum (10.08.17 @ 07:45):                39.4 mg/L    HIV: Non-reactive    Blood Cultures: NGTD    Imaging:  Xray Hand 3 Views, Right (10.08.17 @ 03:19)  FINDINGS:   There is no acute fracture or dislocation of the right hand. The joint spaces are maintained. There is soft tissue swelling with tiny locules of air overlying the dorsum of the wrist and metacarpal bones. No radiopaque foreign body visualized.  IMPRESSION:   No acute fracture or dislocation.  Soft tissue swelling with tiny locules of air overlying the dorsum of the   wrist and metacarpal bones. Mr. Weeks is a 52 y.o. male with a history of HTN and psoriatic arthritis on MTX who presented yesterday with right wrist/hand swelling and erythema. He was in his usual state of health until Sunday afternoon when he began to notice increased pain in his right wrist with swelling. He described the pain as a 10/10 throbbing pain without radiation, worsened by flexion and extension of the wrist and alleviated by elevation. Of note, he recently received a wrist joint injection by his rheumatologist (Dr. Abelino Abraham: [phone]), 5 days prior to admission. He receives these injections every 2- 2 1/2 months. He denies any recent trauma, travel, tick/insect/animal bites ("[He is] an indoors hamida"). He denied fever and chills.    He was first diagnosed with psoriasis arthritis 10 years ago. His first manifestation was arthritis. He had then developed scalp involvement. He was started on MTX ~ 8 years ago. The disease has progressed slowly over that period of time. He states that the arthritis has mostly involved his wrists but has involved ankles and knees on previous occasions. He denies ever having a flair with wrist at the same severity as this presentation. He report psoriatic plaques on his back, left knee, left wrist, right AC fossa, and his sacrum. He reports worsening farsightedness but no other occular symptoms. He denies edema, dysuria, and new sexual partners.      He denies any history of gout.    Hospital Course:  In the ED T 99.4  HR 90s -140s/70-90s RR 16 SpO2 100 on RA  He was started on Clindamycin 900 mg q8hrs, Solumedrol 125 mg x1, Morphine 8 mg IV, Ketorolac 300 mg IV, 1 L Bolus  He was continued on Clindamycin and Percocet.  Fluocinonide (for psoriatic rash), folic acid, and enalapril were also continued.  An attempt was made to obtain joint fluid to no avail.  Hand surgery and infectious diseases were consulted.  Per surgery: No surgical intervention. would r/o infectious etiology and get rheumatology evaluation  Per ID: Unlikely infected joint, continue with clinda, get rhuem eval  An MRI of the wrist was ordered.    ROS: Negative except as in HPI  PMH: HTN, psoriatic arthritis  PSH: None reported  Family: Brother with HTN and rheumatoid arthritis, Mother with history of RA, HTN, and CABG x3  Social: 1/2 ppd for 30 years, 4-5 drinks / day only on weekends, No illicit. 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 No edema  Neurological: Alert and oriented x3. Non-focal  Skin:        Psoriatic Rash:              Located on back x3 lessions (one central, one left superior, one right inferior).             Right antecubital fossa laterally, Left wrist medially, Left knee, Low thorax over xyphoid and low sternum, periumbilical       Erythema of the dorsum of the right extending from the wrist to the MCP joints. Previously marked extend in the ED, receded from that pen line.       No Tophi appreciated at ears or elbows       Nail bed pitting most prominent on the right 3rd digit.  MSK: Right hand with tenderness to wrist palpation. Wrist is warm and there is swelling of the dorsum of the hand with effacement of the tendons compared to the left hand. Unable to extend or flex at wrist from neutral position. Fingers unaffected. No synovititis appreciated in the MCP, PIP, or DIP joints.      Deformity of the left third DIP joint secondary to old sport injury.   Psychiatric: Normal mood and affect    Labs:                        14.6   17.56 )-----------( 387      ( 10 Oct 2017 06:30 )             45.7     10-10    135  |  96<L>  |  17  ----------------------------<  122<H>  4.8   |  24  |  0.89    Ca    9.1      10 Oct 2017 06:30    Uric Acid, Serum (10.07.17 @ 23:10):                             5.9 mg/dL  Sedimentation Rate, Erythrocyte (10.08.17 @ 07:45):       8 mm/hr  C-Reactive Protein, Serum (10.08.17 @ 07:45):                39.4 mg/L    HIV: Non-reactive    Blood Cultures: NGTD    Imaging:  Xray Hand 3 Views, Right (10.08.17 @ 03:19)  FINDINGS:   There is no acute fracture or dislocation of the right hand. The joint spaces are maintained. There is soft tissue swelling with tiny locules of air overlying the dorsum of the wrist and metacarpal bones. No radiopaque foreign body visualized.  IMPRESSION:   No acute fracture or dislocation.  Soft tissue swelling with tiny locules of air overlying the dorsum of the   wrist and metacarpal bones.

## 2017-10-10 NOTE — PROGRESS NOTE ADULT - SUBJECTIVE AND OBJECTIVE BOX
Follow Up:      [place] History/ROS:Patient is a 52y old  Male who presents with a chief complaint of Right wrist pain , swelling, erythema (08 Oct 2017 13:41)    Still has right wrist pain.     No change.   No fever.     Allergies    No Known Allergies    Intolerances        ANTIMICROBIALS:  clindamycin IVPB 900 every 8 hours      OTHER MEDS:  acetaminophen   Tablet. 650 milliGRAM(s) Oral every 6 hours PRN  enalapril 10 milliGRAM(s) Oral daily  fluocinonide 0.05% Ointment 1 Application(s) Topical daily PRN  folic acid 1 milliGRAM(s) Oral daily  nicotine - 21 mG/24Hr(s) Patch 1 patch Transdermal daily  oxyCODONE    5 mG/acetaminophen 325 mG 1 Tablet(s) Oral every 4 hours PRN      Vital Signs Last 24 Hrs  T(C): 36.7 (10 Oct 2017 04:42), Max: 36.9 (09 Oct 2017 20:49)  T(F): 98.1 (10 Oct 2017 04:42), Max: 98.4 (09 Oct 2017 20:49)  HR: 85 (10 Oct 2017 04:42) (85 - 100)  BP: 137/98 (10 Oct 2017 04:42) (126/63 - 137/98)  BP(mean): --  RR: 17 (10 Oct 2017 04:42) (17 - 18)  SpO2: 100% (10 Oct 2017 04:42) (100% - 100%)    PHYSICAL EXAM:  General: [x ] non-toxic  HEAD/EYES: [ ] PERRL [ x] white sclera [ ] icterus  ENT:  [ ] normal [x ] supple [ ] thrush [ ] pharyngeal exudate  Cardiovascular:   [ ] murmur [ x] normal [ ] PPM/AICD  Respiratory:  [x ] clear to ausculation bilaterally  GI:  [x ] soft, non-tender, normal bowel sounds  :  [ ] mabry [ ] no CVA tenderness   Musculoskeletal:  [x pain with ROM right wrist  Neurologic:  [x ] non-focal exam   Skin:  [x ] no rash  Lymph: [ ] no lymphadenopathy  Psychiatric:  [ ] appropriate affect [ ] alert & oriented  Lines:  [ x] no phlebitis [ ] central line                                14.6   17.56 )-----------( 387      ( 10 Oct 2017 06:30 )             45.7       10-10    135  |  96<L>  |  17  ----------------------------<  122<H>  4.8   |  24  |  0.89    Ca    9.1      10 Oct 2017 06:30            MICROBIOLOGY:    RADIOLOGY:

## 2017-10-11 LAB
HCT VFR BLD CALC: 47.3 % — SIGNIFICANT CHANGE UP (ref 39–50)
HGB BLD-MCNC: 15.4 G/DL — SIGNIFICANT CHANGE UP (ref 13–17)
MCHC RBC-ENTMCNC: 24.6 PG — LOW (ref 27–34)
MCHC RBC-ENTMCNC: 32.6 % — SIGNIFICANT CHANGE UP (ref 32–36)
MCV RBC AUTO: 75.6 FL — LOW (ref 80–100)
NRBC # FLD: 0 — SIGNIFICANT CHANGE UP
PLATELET # BLD AUTO: 379 K/UL — SIGNIFICANT CHANGE UP (ref 150–400)
PMV BLD: 9.8 FL — SIGNIFICANT CHANGE UP (ref 7–13)
RBC # BLD: 6.26 M/UL — HIGH (ref 4.2–5.8)
RBC # FLD: 17.4 % — HIGH (ref 10.3–14.5)
WBC # BLD: 13.07 K/UL — HIGH (ref 3.8–10.5)
WBC # FLD AUTO: 13.07 K/UL — HIGH (ref 3.8–10.5)

## 2017-10-11 PROCEDURE — 99233 SBSQ HOSP IP/OBS HIGH 50: CPT

## 2017-10-11 PROCEDURE — 99232 SBSQ HOSP IP/OBS MODERATE 35: CPT | Mod: GC

## 2017-10-11 PROCEDURE — 73223 MRI JOINT UPR EXTR W/O&W/DYE: CPT | Mod: 26,RT

## 2017-10-11 PROCEDURE — 99232 SBSQ HOSP IP/OBS MODERATE 35: CPT

## 2017-10-11 RX ADMIN — OXYCODONE AND ACETAMINOPHEN 1 TABLET(S): 5; 325 TABLET ORAL at 16:00

## 2017-10-11 RX ADMIN — OXYCODONE AND ACETAMINOPHEN 1 TABLET(S): 5; 325 TABLET ORAL at 04:21

## 2017-10-11 RX ADMIN — Medication 1 MILLIGRAM(S): at 15:04

## 2017-10-11 RX ADMIN — Medication 1 PATCH: at 12:55

## 2017-10-11 RX ADMIN — OXYCODONE AND ACETAMINOPHEN 1 TABLET(S): 5; 325 TABLET ORAL at 09:50

## 2017-10-11 RX ADMIN — OXYCODONE AND ACETAMINOPHEN 1 TABLET(S): 5; 325 TABLET ORAL at 03:55

## 2017-10-11 RX ADMIN — OXYCODONE AND ACETAMINOPHEN 1 TABLET(S): 5; 325 TABLET ORAL at 21:31

## 2017-10-11 RX ADMIN — Medication 10 MILLIGRAM(S): at 05:25

## 2017-10-11 RX ADMIN — OXYCODONE AND ACETAMINOPHEN 1 TABLET(S): 5; 325 TABLET ORAL at 08:51

## 2017-10-11 RX ADMIN — Medication 100 MILLIGRAM(S): at 05:24

## 2017-10-11 RX ADMIN — OXYCODONE AND ACETAMINOPHEN 1 TABLET(S): 5; 325 TABLET ORAL at 22:15

## 2017-10-11 RX ADMIN — Medication 100 MILLIGRAM(S): at 15:04

## 2017-10-11 RX ADMIN — Medication 1 PATCH: at 15:05

## 2017-10-11 RX ADMIN — Medication 100 MILLIGRAM(S): at 21:31

## 2017-10-11 RX ADMIN — OXYCODONE AND ACETAMINOPHEN 1 TABLET(S): 5; 325 TABLET ORAL at 15:04

## 2017-10-11 NOTE — PROGRESS NOTE ADULT - PROBLEM SELECTOR PLAN 1
2/2 R wrist/hand cellulitis (pt w/ HR > 100 and leukocytosis). Xray R hand reviewed w/ no acute findings. ED attempted to drain area but not fluid to drain.   On  IV clindamycin. Blood cxs sent  however pt already on abx limiting yield even further   Pain improving.  Pain control w/ tylenol PRN. If tylenol not sufficient can add opiod for better control  Hand sx recs appreciated, no surgical intervention at this time  ID consult called 2/2 R wrist/hand cellulitis /synovitis (pt w/ HR > 100 and leukocytosis). Xray R hand reviewed w/ no acute findings. ED attempted to drain area but not fluid to drain.   On  IV clindamycin. Blood cxs sent  however pt already on abx limiting yield even further   Pain improving.  Pain control w/ tylenol PRN. If tylenol not sufficient can add opiod for better control  Hand sx recs appreciated, no surgical intervention at this time  DW ID - Await MRI results

## 2017-10-11 NOTE — PROGRESS NOTE ADULT - SUBJECTIVE AND OBJECTIVE BOX
Patient is a 52y old  Male who presents with a chief complaint of Right wrist pain , swelling, erythema (08 Oct 2017 13:41)      SUBJECTIVE / OVERNIGHT EVENTS: Pt reports much improvement with pain in the wrist. Able to move his fingers  MEDICATIONS  (STANDING):  clindamycin IVPB 900 milliGRAM(s) IV Intermittent every 8 hours  enalapril 10 milliGRAM(s) Oral daily  folic acid 1 milliGRAM(s) Oral daily  nicotine - 21 mG/24Hr(s) Patch 1 patch Transdermal daily    MEDICATIONS  (PRN):  acetaminophen   Tablet. 650 milliGRAM(s) Oral every 6 hours PRN Moderate Pain (4 - 6)  fluocinonide 0.05% Ointment 1 Application(s) Topical daily PRN Psoriatic Rash  oxyCODONE    5 mG/acetaminophen 325 mG 1 Tablet(s) Oral every 4 hours PRN Severe Pain (7 - 10)    Vital Signs Last 24 Hrs  T(C): 36.7 (09 Oct 2017 13:53), Max: 37.6 (08 Oct 2017 20:10)  T(F): 98 (09 Oct 2017 13:53), Max: 99.7 (08 Oct 2017 20:10)  HR: 100 (09 Oct 2017 13:53) (91 - 100)  BP: 126/63 (09 Oct 2017 13:53) (126/63 - 147/88)  BP(mean): --  RR: 18 (09 Oct 2017 13:53) (17 - 18)  SpO2: 100% (09 Oct 2017 13:53) (98% - 100%)    CAPILLARY BLOOD GLUCOSE        I&O's Summary      PHYSICAL EXAM:  GENERAL: NAD, well-developed  HEAD:  Atraumatic, Normocephalic  EYES: EOMI, PERRLA, conjunctiva and sclera clear  NECK: Supple, No JVD  CHEST/LUNG: Clear to auscultation bilaterally; No wheeze  HEART: Regular rate and rhythm; No murmurs, rubs, or gallops  ABDOMEN: Soft, Nontender, Nondistended; Bowel sounds present  EXTREMITIES:  2+ Peripheral Pulses, No clubbing, cyanosis, or edema  PSYCH: AAOx3    NEUROLOGY: non-focal  SKIN: psoriatic rash on back chest, umbilical area, L knee, and arm.   MSK: Right hand w/ swelling, erythema, and warmth, erythema extends over the wrist joint. No purulent discharge. + TTP  Arthritis deformation of some PIP joints        LABS:                        14.1   20.42 )-----------( 368      ( 09 Oct 2017 05:50 )             44.0     10-09    138  |  100  |  18  ----------------------------<  132<H>  4.2   |  22  |  0.90    Ca    8.6      09 Oct 2017 05:50    TPro  7.3  /  Alb  4.2  /  TBili  0.2  /  DBili  x   /  AST  23  /  ALT  32  /  AlkPhos  80  10-07              RADIOLOGY & ADDITIONAL TESTS:    Imaging Personally Reviewed:    Consultant(s) Notes Reviewed:      Care Discussed with Consultants/Other Providers: Lou

## 2017-10-11 NOTE — PROGRESS NOTE ADULT - ASSESSMENT
52 y.o male with history of psoriatic arthritis and recent wrist joint space steroid injection (6 days ago) now with 3 days of swelling and erythema most consistent with septic arthritis. The pain is described as within the joint and exacerbated by movement of the joint suggestive of an intra-articular process (synovitis). Given the timing of the recent injection, would strongly favor nongonococcal septic joint.     Synovitis:      Likely 2/2 recent injection. Would treat as infected until proven otherwise. Unlikely gout with no uric acid elevation and no history. Psoriatic arthritis flare possible but less likely given atypical for the patients flairs, recent injection. Locules of air on x-ray may suggest presence of bacteria but is more likely tracking from attempt at joint aspiration.       - c/w clindamycin for now       - Appears to be improving, continue to monitor       - f/u MRI       - Tylenol for pain          - hold methotrexate 52 y.o male with history of psoriatic arthritis and recent wrist joint space steroid injection (6 days ago) now with 3 days of swelling and erythema most consistent with septic arthritis. The pain is described as within the joint and exacerbated by movement of the joint suggestive of an intra-articular process (synovitis). Given the timing of the recent injection, would strongly favor nongonococcal septic joint.     Synovitis:      Likely 2/2 recent injection. Would treat as infected until proven otherwise. Unlikely gout with no uric acid elevation and no history. Psoriatic arthritis flare possible but less likely given atypical for the patients flairs, recent injection. Locules of air on x-ray may suggest presence of bacteria but is more likely tracking from attempt at joint aspiration.       - c/w clindamycin for now       - Appears to be improving, continue to monitor            - White count downtrending 13.07 <- 17.56       - f/u MRI       - Tylenol for pain          - hold methotrexate

## 2017-10-11 NOTE — PROGRESS NOTE ADULT - PROBLEM SELECTOR PLAN 5
Improve score of 0. Low risk for VTE. Would encourage ambulation. SCDs if not ambulating Improve score of 0. Low risk for VTE. Would encourage ambulation. SCDs if not ambulating  Dispo - DC Planning pending MRI   DC Planning 40  mins

## 2017-10-11 NOTE — PROGRESS NOTE ADULT - SUBJECTIVE AND OBJECTIVE BOX
Follow Up:      [place] History/ROS:Patient is a 52y old  Male who presents with a chief complaint of Right wrist pain , swelling, erythema (08 Oct 2017 13:41)    His wrist pain is a little better. he has improved range of motion to his wrist/ hand.    No fever.      Allergies    No Known Allergies    Intolerances        ANTIMICROBIALS:  clindamycin IVPB 900 every 8 hours      OTHER MEDS:  acetaminophen   Tablet. 650 milliGRAM(s) Oral every 6 hours PRN  enalapril 10 milliGRAM(s) Oral daily  fluocinonide 0.05% Ointment 1 Application(s) Topical daily PRN  folic acid 1 milliGRAM(s) Oral daily  nicotine - 21 mG/24Hr(s) Patch 1 patch Transdermal daily  oxyCODONE    5 mG/acetaminophen 325 mG 1 Tablet(s) Oral every 4 hours PRN      Vital Signs Last 24 Hrs  T(C): 36.9 (11 Oct 2017 05:21), Max: 37.3 (10 Oct 2017 13:58)  T(F): 98.4 (11 Oct 2017 05:21), Max: 99.2 (10 Oct 2017 13:58)  HR: 83 (11 Oct 2017 05:21) (75 - 103)  BP: 120/90 (11 Oct 2017 05:21) (120/76 - 121/86)  BP(mean): --  RR: 17 (11 Oct 2017 05:21) (17 - 18)  SpO2: 98% (11 Oct 2017 05:21) (98% - 100%)    PHYSICAL EXAM:  General: [x ] non-toxic  HEAD/EYES: [ ] PERRL [ x] white sclera [ ] icterus  ENT:  [ ] normal [ x] supple [ ] thrush [ ] pharyngeal exudate  Cardiovascular:   [ ] murmur [x ] normal [ ] PPM/AICD  Respiratory:  [x ] clear to ausculation bilaterally  GI:  [x ] soft, non-tender, normal bowel sounds  :  [ ] mabry [ ] no CVA tenderness   Musculoskeletal:  [x ]decreased tenderness at the wrist  Neurologic:  [ ] non-focal exam   Skin:  [ ] no rash  Lymph: [x ] no lymphadenopathy  Psychiatric:  [x] appropriate affect [ ] alert & oriented  Lines:  [ x] no phlebitis [ ] central line                                15.4   13.07 )-----------( 379      ( 11 Oct 2017 06:33 )             47.3       10-10    135  |  96<L>  |  17  ----------------------------<  122<H>  4.8   |  24  |  0.89    Ca    9.1      10 Oct 2017 06:30            MICROBIOLOGY:    RADIOLOGY:

## 2017-10-11 NOTE — PROGRESS NOTE ADULT - SUBJECTIVE AND OBJECTIVE BOX
Patient is a 52y old  Male who presents with a chief complaint of Right wrist pain , swelling, erythema (08 Oct 2017 13:41)      Overnight Events: No acute events  Subjective: Feels hand movement improved. Span of erythema diminished. Pain improved  General: No fever or chills.  Extremities: No swelling  Skin: No rashes, pruritis    PHYSICAL EXAM:  Vitals: T(F): 98.4  HR: 83  BP: 120/90  RR: 17  SpO2: 98% on RA  General: No acute distress  HEENT: EOMI, PERRLA, MMM, arcus senilis, sclera/conjuntiva wnl  Neck: Supple, no LAD  Back: No CVA, spinal, or paraspinal tenderness   Pulmonary: CTAB, no wheezes, no crackles  Cardiac: RRR, normal S1, S2, no mrg  Abdomen: Soft, non-tender, non-distended, normal resonance, bowel sounds present  Extremities: WWP in all extremities, palpable radial, dorsalis and posterior tibial pulses bilaterally. No edema  Neurological: Alert and oriented x3. Non-focal  Skin:        Psoriatic Rash unchanged from previous exam.        Erythema of the dorsum of the right extending from the wrist to the MCP joints. Markedly improved, further receded from marker line.      MSK: Right hand with tenderness to wrist palpation improved. Wrist warmth reduced from yesterday. Tendon and vein visibility improved compared to yesterday. Flexion to 30 degrees and extension to 45 degrees, also improved markedly day over day. No synovititis appreciated in the MCP, PIP, or DIP joints.  Psychiatric: Normal mood and affect      LABS:  CAPILLARY BLOOD GLUCOSE        I&O's Summary                            15.4   13.07 )-----------( 379      ( 11 Oct 2017 06:33 )             47.3     WBC Trend: 13.07<--, 17.56<--, 20.42<--  10-10    135  |  96<L>  |  17  ----------------------------<  122<H>  4.8   |  24  |  0.89    Ca    9.1      10 Oct 2017 06:30      Creatinine Trend: 0.89<--, 0.90<--, 0.99<--, 0.98<--, 1.01<--      MEDICATIONS  (STANDING):  clindamycin IVPB 900 milliGRAM(s) IV Intermittent every 8 hours  enalapril 10 milliGRAM(s) Oral daily  folic acid 1 milliGRAM(s) Oral daily  nicotine - 21 mG/24Hr(s) Patch 1 patch Transdermal daily    MEDICATIONS  (PRN):  acetaminophen   Tablet. 650 milliGRAM(s) Oral every 6 hours PRN Moderate Pain (4 - 6)  fluocinonide 0.05% Ointment 1 Application(s) Topical daily PRN Psoriatic Rash  oxyCODONE    5 mG/acetaminophen 325 mG 1 Tablet(s) Oral every 4 hours PRN Severe Pain (7 - 10)

## 2017-10-11 NOTE — PROGRESS NOTE ADULT - PROBLEM SELECTOR PLAN 2
Team dw  Dr. Abraham, private Rheum   Pts dosage is Methotrexate 15 mg q weekly on Tuesdays  Pt states that he has been stable on MTX for many years. He uses topical ointment PRN but tries not to use it too often. His arthritis involves the finger joints but mostly his b/l wrist and he obtains steroid injection every couple of months to that area. C/w home MTX. Team dw  Dr. Abraham, private Rheum   Pts dosage is Methotrexate 15 mg q weekly on Tuesdays. Rheum on board- Hold off MTX

## 2017-10-11 NOTE — PROGRESS NOTE ADULT - ASSESSMENT
52 year old with psoriatic arthritis with right wrist pain after a steroid injection.  Unclear etiology of pain.   The differential diagnosis includes a flair of the psoriatic arthritis, gout, or an infected joint after the joint injection.    AN infected joint is possible but the diagnosis is not clear as the patient recieved both antibiotics and steroids before a diagnosis was established.    At this point, additional cultures would liekly be low yield.    If MRI suggests septic joint, tx empirically with vancomycin 1 gm iv q 12 for three additional weeks. Weekly cbc, bmp, vanco through faxed to me at [phone]    He will need close outpatient follow up with his rheumatologist for further evaluation if his pain does not resolve.

## 2017-10-12 ENCOUNTER — TRANSCRIPTION ENCOUNTER (OUTPATIENT)
Age: 52
End: 2017-10-12

## 2017-10-12 LAB
ANA TITR SER: NEGATIVE — SIGNIFICANT CHANGE UP
HCT VFR BLD CALC: 43.8 % — SIGNIFICANT CHANGE UP (ref 39–50)
HGB BLD-MCNC: 14.2 G/DL — SIGNIFICANT CHANGE UP (ref 13–17)
MCHC RBC-ENTMCNC: 24.1 PG — LOW (ref 27–34)
MCHC RBC-ENTMCNC: 32.4 % — SIGNIFICANT CHANGE UP (ref 32–36)
MCV RBC AUTO: 74.4 FL — LOW (ref 80–100)
NRBC # FLD: 0 — SIGNIFICANT CHANGE UP
PLATELET # BLD AUTO: 431 K/UL — HIGH (ref 150–400)
PMV BLD: 9.4 FL — SIGNIFICANT CHANGE UP (ref 7–13)
RBC # BLD: 5.89 M/UL — HIGH (ref 4.2–5.8)
RBC # FLD: 15.9 % — HIGH (ref 10.3–14.5)
WBC # BLD: 12.9 K/UL — HIGH (ref 3.8–10.5)
WBC # FLD AUTO: 12.9 K/UL — HIGH (ref 3.8–10.5)

## 2017-10-12 PROCEDURE — 77001 FLUOROGUIDE FOR VEIN DEVICE: CPT | Mod: 26,GC

## 2017-10-12 PROCEDURE — 76937 US GUIDE VASCULAR ACCESS: CPT | Mod: 26

## 2017-10-12 PROCEDURE — 99232 SBSQ HOSP IP/OBS MODERATE 35: CPT | Mod: GC

## 2017-10-12 PROCEDURE — 36569 INSJ PICC 5 YR+ W/O IMAGING: CPT

## 2017-10-12 PROCEDURE — 99232 SBSQ HOSP IP/OBS MODERATE 35: CPT

## 2017-10-12 RX ORDER — ACETAMINOPHEN 500 MG
0 TABLET ORAL
Qty: 0 | Refills: 0 | COMMUNITY

## 2017-10-12 RX ORDER — ACETAMINOPHEN 500 MG
2 TABLET ORAL
Qty: 0 | Refills: 0 | COMMUNITY
Start: 2017-10-12

## 2017-10-12 RX ORDER — VANCOMYCIN HCL 1 G
1 VIAL (EA) INTRAVENOUS
Qty: 42 | Refills: 0 | OUTPATIENT
Start: 2017-10-12 | End: 2017-11-02

## 2017-10-12 RX ORDER — VANCOMYCIN HCL 1 G
1000 VIAL (EA) INTRAVENOUS EVERY 12 HOURS
Qty: 0 | Refills: 0 | Status: DISCONTINUED | OUTPATIENT
Start: 2017-10-12 | End: 2017-10-13

## 2017-10-12 RX ADMIN — Medication 1 PATCH: at 13:06

## 2017-10-12 RX ADMIN — Medication 10 MILLIGRAM(S): at 05:07

## 2017-10-12 RX ADMIN — Medication 100 MILLIGRAM(S): at 13:06

## 2017-10-12 RX ADMIN — OXYCODONE AND ACETAMINOPHEN 1 TABLET(S): 5; 325 TABLET ORAL at 13:03

## 2017-10-12 RX ADMIN — OXYCODONE AND ACETAMINOPHEN 1 TABLET(S): 5; 325 TABLET ORAL at 05:07

## 2017-10-12 RX ADMIN — Medication 1 MILLIGRAM(S): at 13:06

## 2017-10-12 RX ADMIN — OXYCODONE AND ACETAMINOPHEN 1 TABLET(S): 5; 325 TABLET ORAL at 13:33

## 2017-10-12 RX ADMIN — Medication 250 MILLIGRAM(S): at 18:02

## 2017-10-12 RX ADMIN — Medication 1 PATCH: at 13:07

## 2017-10-12 RX ADMIN — Medication 100 MILLIGRAM(S): at 05:07

## 2017-10-12 NOTE — DISCHARGE NOTE ADULT - HOSPITAL COURSE
51 yo male patient with pre-medical history of hypertension, psoriatic arthritis (arthritis diagnosed about 10 yrs ago, psoriasis about 8 yrs ago) on methotrexate associated with sepsis secondary to right hand/wrist cellulitis in the setting of recent steroid injection to the right wrist.     R wrist / hand cellulitis  - ID house Dr Kessler consulted  - Xray showed no acute findings.  - treated initially with intravenous Clindamycin q8 hrs - now IV vancomycin  - Blood Cx- negative to date  - Hand surgeon- Dr Trevizo -recommended no surgical intervention at this time  - Pain control with tylenol PRN.   - MRI Rt wrist: Dorsal hand and wrist cellulitic changes.  Findings compatible with predominately 4th extensor compartment   tenosynovitis and joint capsule synovitis along radial aspect of the   wrist and volar aspect of the distal radioulnar joint.  No MRI evidence for osteomyelitis or focal drainable abscess collections.    Psoriatic arthritis  - Rheum consulted  - Methotrexate 15 mg q weekly on Tuesdays held  - Folic acid, s/p IV Solumedrol in the ED.  - PRN topical fluocinonide( therapeutic interchange for pts home halobetasol ointment ) 53 yo male patient with pre-medical history of hypertension, psoriatic arthritis (arthritis diagnosed about 10 yrs ago, psoriasis about 8 yrs ago) on methotrexate associated with sepsis secondary to right hand/wrist cellulitis in the setting of recent steroid injection to the right wrist.     Sepsis  -2/2 R wrist/hand cellulitis /synovitis   - ID house Dr Kessler consulted  - Xray showed no acute findings.  - treated initially with intravenous Clindamycin q8 hrs - now IV vancomycin through 10/28  - Blood Cx- negative to date  - Hand surgeon- Dr Trevizo -recommended no surgical intervention at this time  - Pain control with tylenol PRN.   - MRI Rt wrist: Dorsal hand and wrist cellulitic changes.  Findings compatible with predominately 4th extensor compartment   tenosynovitis and joint capsule synovitis along radial aspect of the   wrist and volar aspect of the distal radioulnar joint.  No MRI evidence for osteomyelitis or focal drainable abscess collections.  Please follow up with Dr. Kessler Infectious diease in 1-2 weeks    Psoriatic arthritis  - Rheum consulted  - Methotrexate 15 mg q weekly on Tuesdays held pt to follow up with Rheum as an outpt  - Folic acid, s/p IV Solumedrol in the ED.  - PRN topical fluocinonide( therapeutic interchange for pts home halobetasol ointment )  -please follow up with Dr. Abraham    Dispo: Home with IV antibiotics

## 2017-10-12 NOTE — PROGRESS NOTE ADULT - SUBJECTIVE AND OBJECTIVE BOX
Patient is a 52y old  Male who presents with a chief complaint of Right wrist pain , swelling, erythema (08 Oct 2017 13:41)      Overnight Events: No acute events  Subjective: Feels hand movement improved slightly from yesterday. Span of erythema diminished. However, pain spiked overnight following having an "active evening" about the unit. Spiked to 7/10, now down to 5/10. Pain slightly worse than yesterday at the same hour.  General: No fever or chills.  Extremities: No swelling  Skin: No rashes, pruritis    PHYSICAL EXAM:  Vitals: T(F): 98.1  HR: 91  BP: 132/94  RR: 18  SpO2: 98% on RA  General: No acute distress  HEENT: EOMI, PERRLA, MMM, arcus senilis, sclera/conjuntiva wnl  Neck: Supple, no LAD  Pulmonary: CTAB, no wheezes, no crackles  Cardiac: RRR, normal S1, S2, no mrg  Abdomen: Soft, non-tender, non-distended, normal resonance, bowel sounds present  Extremities: WWP in all extremities, palpable radial, dorsalis and posterior tibial pulses bilaterally. No edema  Neurological: Alert and oriented x3. Non-focal  Skin:        Psoriatic Rash unchanged from previous exam.        Erythema of the dorsum of right hand, mostly resolved.   MSK: Right hand with tenderness to wrist palpation improved specifically medial to the extensor pollicis longus. Wrist no longer feels warm. Tendon and vein visibility improved compared to yesterday. Flexion to 30 degrees and extension to 45 degrees, similar to yesterday (slightly improved per patient). No synovitis appreciated in the MCP, PIP, or DIP joints.  Psychiatric: Normal mood and affect      LABS:  CAPILLARY BLOOD GLUCOSE        I&O's Summary                            14.2   12.90 )-----------( 431      ( 12 Oct 2017 05:30 )             43.8     WBC Trend: 12.90<--, 13.07<--, 17.56<--        Creatinine Trend: 0.89<--, 0.90<--, 0.99<--, 0.98<--, 1.01<--      MRI Wrist w/wo Cont, Right (10.11.17 @ 14:46):  IMPRESSION:  Dorsal hand and wrist cellulitic changes.    Findings compatible with predominately 4th extensor compartment   tenosynovitis and joint capsule synovitis along radial aspect of the   wrist and volar aspect of the distal radioulnar joint.    No MR evidence for osteomyelitis or focal drainable abscess collections.      MEDICATIONS  (STANDING):  clindamycin IVPB 900 milliGRAM(s) IV Intermittent every 8 hours  enalapril 10 milliGRAM(s) Oral daily  folic acid 1 milliGRAM(s) Oral daily  nicotine - 21 mG/24Hr(s) Patch 1 patch Transdermal daily    MEDICATIONS  (PRN):  acetaminophen   Tablet. 650 milliGRAM(s) Oral every 6 hours PRN Moderate Pain (4 - 6)  fluocinonide 0.05% Ointment 1 Application(s) Topical daily PRN Psoriatic Rash  oxyCODONE    5 mG/acetaminophen 325 mG 1 Tablet(s) Oral every 4 hours PRN Severe Pain (7 - 10)

## 2017-10-12 NOTE — PROGRESS NOTE ADULT - ASSESSMENT
52 y.o male with history of psoriatic arthritis and recent wrist joint space steroid injection (6 days ago) who presented with 3 days of swelling seen to be tenosynovitis with synovitis on MRI. Improving erythema and ROM.    Plan pending attending rounds, no actionable    Synovitis and tenosynovitis:      Likely 2/2 recent injection. Would treat as infected until proven otherwise. MRI demonstrating tenosynovitis and synovisitis.       - Appears to be improving, continue to monitor            - White count downtrending 12.90 <- 13.07 <- 17.56       - follow ID recs regarding abx choice       - f/u hand surgery, ? role for a washout       - Tylenol for pain          - hold methotrexate 52 y.o male with history of psoriatic arthritis and recent wrist joint space steroid injection (6 days ago) who presented with 3 days of swelling seen to be tenosynovitis with synovitis on MRI. Improving erythema and ROM.    Plan pending attending rounds, no actionable    Synovitis and tenosynovitis:      Likely 2/2 recent injection. Would treat as infected until proven otherwise. MRI demonstrating tenosynovitis and synovisitis. No drainable abscess.       - Appears to be improving, continue to monitor            - White count downtrending 12.90 <- 13.07 <- 17.56       - Per hand surgery, likely 2/2 steroid injection into the tendon space. No role for a washout.       - While aseptic, would still be cautious re: possible infection                - follow ID recs regarding abx choice       - Tylenol for pain          - hold methotrexate 52 y.o male with history of psoriatic arthritis and recent wrist joint space steroid injection (6 days ago) who presented with 3 days of swelling seen to be tenosynovitis with synovitis on MRI. Improving erythema and ROM.    Plan pending attending rounds, no actionable    Synovitis and tenosynovitis:      Likely 2/2 recent injection. Would treat as infected until proven otherwise. MRI demonstrating tenosynovitis and synovisitis. No drainable abscess.       - Appears to be improving, continue to monitor            - White count downtrending 12.90 <- 13.07 <- 17.56       - Per hand surgery, likely 2/2 steroid injection into the tendon space. No role for a washout.       - Would still prefer a wash-out but will defer to hand surgery       - While aseptic, would still be cautious re: possible infection                - follow ID recs regarding abx choice       - Tylenol for pain          - hold methotrexate       - f/u with Dr. Abraham 52 y.o male with history of psoriatic arthritis and recent wrist joint space steroid injection (6 days ago) who presented with 3 days of swelling seen to be tenosynovitis with synovitis on MRI. Improving erythema and ROM.    Synovitis and tenosynovitis:      Likely 2/2 recent injection. Would treat as infected until proven otherwise. MRI demonstrating tenosynovitis and synovisitis. No drainable abscess.       - Appears to be improving, continue to monitor            - White count downtrending 12.90 <- 13.07 <- 17.56       - Per hand surgery, likely 2/2 steroid injection into the tendon space. No role for a washout.       - Would still prefer a wash-out but will defer to hand surgery       - While aseptic, would still be cautious re: possible infection                - follow ID recs regarding abx choice       - Tylenol for pain          - hold methotrexate       - f/u with Dr. Abraham 52 y.o male with history of psoriatic arthritis and recent wrist joint space steroid injection (6 days ago) who presented with 3 days of swelling seen to be cellulitis,  tenosynovitis with synovitis on MRI. Improving erythema and ROM.    Synovitis and tenosynovitis:      Likely 2/2 recent injection. Would treat as infected until proven otherwise. MRI demonstrating cellulitis, tenosynovitis and synovitis No drainable abscess.       - Appears to be improving, continue to monitor            - White count downtrending 12.90 <- 13.07 <- 17.56       - Per hand surgery, likely 2/2 steroid injection into the tendon space. No role for a washout.       - Would still prefer a wash-out but will defer to hand surgery       - While aseptic, would still be cautious re: possible infection                - follow ID recs regarding abx choice       - Tylenol for pain          - hold methotrexate until infection resolved.        - f/u with Dr. Abraham (outpatient rheumatologist) 52 y.o male with history of psoriatic arthritis and recent wrist joint space steroid injection (6 days ago) who presented with 3 days of swelling seen to be cellulitis,  tenosynovitis with synovitis on MRI. Improving erythema and ROM.    Synovitis and tenosynovitis:      Likely 2/2 recent injection. Would treat as infected until proven otherwise. MRI demonstrating cellulitis, tenosynovitis and synovitis No drainable abscess.       - Appears to be improving, continue to monitor            - White count downtrending 12.90 <- 13.07 <- 17.56       - Per hand surgery, likely 2/2 steroid injection into the tendon space. No role for a washout.       - Would still prefer a wash-out but will defer to hand surgery       - While aseptic, would still be cautious re: possible infection                - follow ID recs regarding abx choice       - Tylenol for pain          - hold methotrexate until infection resolved.        - f/u with Dr. Abraham (outpatient rheumatologist)       -  please re-call if needed during this hospitalization

## 2017-10-12 NOTE — PROGRESS NOTE ADULT - SUBJECTIVE AND OBJECTIVE BOX
Patient is a 52y old  Male who presents with a chief complaint of Right wrist pain , swelling, erythema (08 Oct 2017 13:41)      SUBJECTIVE / OVERNIGHT EVENTS:  Pt reports much improvement with pain in the wrist. Able to move his fingers Denies fever, chills     MEDICATIONS  (STANDING):  clindamycin IVPB 900 milliGRAM(s) IV Intermittent every 8 hours  enalapril 10 milliGRAM(s) Oral daily  folic acid 1 milliGRAM(s) Oral daily  nicotine - 21 mG/24Hr(s) Patch 1 patch Transdermal daily    MEDICATIONS  (PRN):  acetaminophen   Tablet. 650 milliGRAM(s) Oral every 6 hours PRN Moderate Pain (4 - 6)  fluocinonide 0.05% Ointment 1 Application(s) Topical daily PRN Psoriatic Rash  oxyCODONE    5 mG/acetaminophen 325 mG 1 Tablet(s) Oral every 4 hours PRN Severe Pain (7 - 10)      Vital Signs Last 24 Hrs  T(C): 36.7 (12 Oct 2017 05:06), Max: 37.1 (11 Oct 2017 21:31)  T(F): 98.1 (12 Oct 2017 05:06), Max: 98.8 (11 Oct 2017 21:31)  HR: 91 (12 Oct 2017 05:06) (91 - 112)  BP: 132/94 (12 Oct 2017 05:06) (127/87 - 133/97)  BP(mean): --  RR: 18 (12 Oct 2017 05:06) (17 - 18)  SpO2: 98% (12 Oct 2017 05:06) (96% - 98%)      I&O's Summary      PHYSICAL EXAM:  GENERAL: NAD, well-developed  HEAD:  Atraumatic, Normocephalic  EYES: EOMI, PERRLA, conjunctiva and sclera clear  NECK: Supple, No JVD  CHEST/LUNG: Clear to auscultation bilaterally; No wheeze  HEART: Regular rate and rhythm; No murmurs, rubs, or gallops  ABDOMEN: Soft, Nontender, Nondistended; Bowel sounds present  EXTREMITIES:  2+ Peripheral Pulses, No clubbing, cyanosis, or edema  PSYCH: AAOx3  NEUROLOGY: non-focal  SKIN: psoriatic rash on back chest, umbilical area, L knee, and arm.   MSK: Right hand   erythema - improving.minimal swelling   No purulent discharge. + TTP  Arthritis deformation of some PIP joints        LABS:                                   14.2   12.90 )-----------( 431      ( 12 Oct 2017 05:30 )             43.8               RADIOLOGY & ADDITIONAL TESTS:    Imaging Personally Reviewed:    Consultant(s) Notes Reviewed:      Care Discussed with Consultants/Other Providers: ID

## 2017-10-12 NOTE — PROGRESS NOTE ADULT - SUBJECTIVE AND OBJECTIVE BOX
Follow Up:      Inverval History/ROS:Patient is a 52y old  Male who presents with a chief complaint of Right wrist pain , swelling, erythema (12 Oct 2017 12:28)    Decreased pain, improved range of motion  Allergies    No Known Allergies    Intolerances        ANTIMICROBIALS:  clindamycin IVPB 900 every 8 hours      OTHER MEDS:  acetaminophen   Tablet. 650 milliGRAM(s) Oral every 6 hours PRN  enalapril 10 milliGRAM(s) Oral daily  fluocinonide 0.05% Ointment 1 Application(s) Topical daily PRN  folic acid 1 milliGRAM(s) Oral daily  nicotine - 21 mG/24Hr(s) Patch 1 patch Transdermal daily  oxyCODONE    5 mG/acetaminophen 325 mG 1 Tablet(s) Oral every 4 hours PRN      Vital Signs Last 24 Hrs  T(C): 36.7 (12 Oct 2017 05:06), Max: 37.1 (11 Oct 2017 21:31)  T(F): 98.1 (12 Oct 2017 05:06), Max: 98.8 (11 Oct 2017 21:31)  HR: 91 (12 Oct 2017 05:06) (91 - 112)  BP: 132/94 (12 Oct 2017 05:06) (127/87 - 133/97)  BP(mean): --  RR: 18 (12 Oct 2017 05:06) (17 - 18)  SpO2: 98% (12 Oct 2017 05:06) (96% - 98%)    PHYSICAL EXAM:  General: [x ] non-toxic  HEAD/EYES: [ ] PERRL [x ] white sclera [ ] icterus  ENT:  [ ] normal [x ] supple [ ] thrush [ ] pharyngeal exudate  Cardiovascular:   [ ] murmur [x ] normal [ ] PPM/AICD  Respiratory:  [x ] clear to ausculation bilaterally  GI:  [ x] soft, non-tender, normal bowel sounds  :  [ ] mabry [ ] no CVA tenderness   Musculoskeletal:  [x ] decreased tenderness  Neurologic:  [ ] non-focal exam   Skin:  [x ] no rash  Lymph: [ ] no lymphadenopathy  Psychiatric:  [x ] appropriate affect [ ] alert & oriented  Lines:  [ x] no phlebitis [ ] central line                                14.2   12.90 )-----------( 431      ( 12 Oct 2017 05:30 )             43.8                   MICROBIOLOGY:    RADIOLOGY:

## 2017-10-12 NOTE — PROGRESS NOTE ADULT - PROBLEM SELECTOR PLAN 5
Improve score of 0. Low risk for VTE. Would encourage ambulation. SCDs if not ambulating  Dispo - DC Planning pending MRI   DC Planning 40  mins Improve score of 0. Low risk for VTE. Would encourage ambulation. SCDs if not ambulating  Dispo - DC Planning to home  40 mins  DC Planning 40  mins

## 2017-10-12 NOTE — PROGRESS NOTE ADULT - ASSESSMENT
53 yo M w/ PMHx HTN, psoriatic arthritis (arthritis dx about 10 yrs ago, psoriasis about 8 yrs ago) on MTX, a/w sepsis 2/2 R hand/wrist cellulitis in the setting of recent steroid injection to the R wrist.

## 2017-10-12 NOTE — DISCHARGE NOTE ADULT - CARE PROVIDER_API CALL
Usman Kessler), Infectious Disease  [address] [place] [address]  Phone: [phone]  Fax: [phone]

## 2017-10-12 NOTE — DISCHARGE NOTE ADULT - CARE PLAN
Principal Discharge DX:	Wrist pain, acute, right  Goal:	pain relief  Instructions for follow-up, activity and diet:	He will need close outpatient follow up with his rheumatologist for further evaluation if his pain does not resolve.  Continue with tylenol 650 mg po every 6 hours as needed for pain relief  Secondary Diagnosis:	Psoriatic arthritis  Goal:	goal- reduction of pain; improvement of other symptoms of disease, including skin and nail involvement; optimization of functional capacity and quality of life; and inhibition of the progression of joint damage.  Instructions for follow-up, activity and diet:	He will need close outpatient follow up with his rheumatologist for further evaluation if his pain does not resolve.  Secondary Diagnosis:	Essential hypertension  Goal:	to attain and maintain goal BP.  Instructions for follow-up, activity and diet:	continue with enalapril  Secondary Diagnosis:	Hyperkalemia  Goal:	to prevent arrhythmia, shift potassium into the cells, and enhance elimination of potassium from the body  Secondary Diagnosis:	Sepsis  Goal:	to remain infection free  Instructions for follow-up, activity and diet:	If MRI suggests septic joint, tx empirically with vancomycin 1 gm iv q 12 for three additional weeks. Weekly cbc, bmp, vanco trough faxed to me at [phone] Dr. Kessler - infectious disease Principal Discharge DX:	Sepsis  Goal:	2/2 R wrist/hand cellulitis /synovitis  Instructions for follow-up, activity and diet:	He will need close outpatient follow up with his rheumatologist for further evaluation if his pain does not resolve. Please follow up with Dr. Abraham. Please call to make an appoitnment within 1 week of discharge.  Please hold methotrexate until infection resolves.  Please follow up with your pcp within 1 week of discharge.  Please call to make an appointment within 1 week of discharge.  Please complete course of antibiotics as prescribed.  Please follow up with Dr. Kessler Within 1-2 weeks of discharge.  Please call to make an appointment. Please send weekly labs   cbc, bmp, vanco through faxed to me at [phone]. Continue pain management .  Secondary Diagnosis:	Psoriatic arthritis  Goal:	goal- reduction of pain; improvement of other symptoms of disease, including skin and nail involvement; optimization of functional capacity and quality of life; and inhibition of the progression of joint damage.  Instructions for follow-up, activity and diet:	He will need close outpatient follow up with his rheumatologist for further evaluation if his pain does not resolve.  Secondary Diagnosis:	Essential hypertension  Goal:	to attain and maintain goal BP.  Instructions for follow-up, activity and diet:	continue with enalapril  Secondary Diagnosis:	Hyperkalemia  Goal:	to prevent arrhythmia, shift potassium into the cells, and enhance elimination of potassium from the body Principal Discharge DX:	Sepsis  Goal:	2/2 R wrist/hand cellulitis /synovitis  Instructions for follow-up, activity and diet:	He will need close outpatient follow up with his rheumatologist for further evaluation if his pain does not resolve. Please follow up with Dr. Abraham. Please call to make an appoitnment within 1 week of discharge.  Please hold methotrexate until infection resolves.  Please follow up with your pcp within 1 week of discharge.  Please call to make an appointment within 1 week of discharge.  Please complete course of antibiotics as prescribed through 10/28.  Please follow up with Dr. Kessler Within 1-2 weeks of discharge.  Please call to make an appointment. Please send weekly labs   cbc, bmp, vanco through faxed to me at [phone]. Continue pain management .  Secondary Diagnosis:	Psoriatic arthritis  Goal:	goal- reduction of pain; improvement of other symptoms of disease, including skin and nail involvement; optimization of functional capacity and quality of life; and inhibition of the progression of joint damage.  Instructions for follow-up, activity and diet:	He will need close outpatient follow up with his rheumatologist for further evaluation if his pain does not resolve.  Secondary Diagnosis:	Essential hypertension  Goal:	to attain and maintain goal BP.  Instructions for follow-up, activity and diet:	continue with enalapril  Secondary Diagnosis:	Hyperkalemia  Goal:	to prevent arrhythmia, shift potassium into the cells, and enhance elimination of potassium from the body

## 2017-10-12 NOTE — DISCHARGE NOTE ADULT - PATIENT PORTAL LINK FT
“You can access the Follow[facility] Patient Portal, offered by [facility], by registering with the following website: [email or web address]”

## 2017-10-12 NOTE — PROGRESS NOTE ADULT - ASSESSMENT
52 year old with psoriatic arthritis with right wrist pain after a steroid injection.  Unclear etiology of pain.   The differential diagnosis includes a flair of the psoriatic arthritis, gout, or an infected joint after the joint injection.    MRI finding noted- primary team discussed with hand surgery- no plan for draiange. Pt is clinically improved.    Change to vacno 1 gm iv q 12 through 10/28  weekly cbc, bmp , vanco through fax to [phone]    Call with questions.  Patient given my card- he can call for follow up if he does not continue to improve with resolution of symptoms by completion of his abx.

## 2017-10-12 NOTE — PROGRESS NOTE ADULT - PROBLEM SELECTOR PLAN 1
2/2 R wrist/hand cellulitis /synovitis (pt w/ HR > 100 and leukocytosis). Xray R hand reviewed w/ no acute findings. ED attempted to drain area but not fluid to drain.   On  IV clindamycin. Blood cxs sent  however pt already on abx limiting yield even further Pain improving.  Pain control w/ tylenol PRN. If tylenol/Percocet for pain control  Hand sx recs appreciated, no surgical intervention at this time  MRI results reviewed - Dorsal hand and wrist cellulitic changes.    Findings compatible with predominately 4th extensor compartment   tenosynovitis and joint capsule synovitis along radial aspect of the   wrist and volar aspect of the distal radioulnar joint.  No MR evidence for osteomyelitis or focal drainable abscess collections.  DW ID- plan to change to vacno 1 gm iv q 12 through 10/28  weekly cbc, bmp , vanco through fax to [phone] 2/2 R wrist/hand cellulitis /synovitis (pt w/ HR > 100 and leukocytosis). Xray R hand reviewed w/ no acute findings. ED attempted to drain area but not fluid to drain.   On  IV clindamycin. Blood cxs sent  however pt already on abx limiting yield even further Pain improving.  Pain control w/ tylenol PRN. If tylenol/Percocet for pain control  Hand sx recs appreciated, no surgical intervention at this time  MRI results reviewed - Dorsal hand and wrist cellulitic changes.  Findings compatible with predominately 4th extensor compartment   tenosynovitis and joint capsule synovitis along radial aspect of the   wrist and volar aspect of the distal radioulnar joint.  No MR evidence for osteomyelitis or focal drainable abscess collections.  DW ID- plan to change to vacno 1 gm iv q 12 through 10/28  weekly cbc, bmp , vanco through fax to [phone]  Await PICC line placement

## 2017-10-12 NOTE — PROGRESS NOTE ADULT - PROBLEM SELECTOR PLAN 2
Team dw  Dr. Abraham, private Rheum   Pts dosage is Methotrexate 15 mg q weekly on Tuesdays. Rheum on board- Hold off MTX FU Team dw  Dr. Abraham, private Rheum   Pts dosage is Methotrexate 15 mg q weekly on Tuesdays. Rheum on board- Hold off MTX FU  Pt to FU with Dr Abraham as outpt

## 2017-10-12 NOTE — DISCHARGE NOTE ADULT - PLAN OF CARE
pain relief He will need close outpatient follow up with his rheumatologist for further evaluation if his pain does not resolve.  Continue with tylenol 650 mg po every 6 hours as needed for pain relief goal- reduction of pain; improvement of other symptoms of disease, including skin and nail involvement; optimization of functional capacity and quality of life; and inhibition of the progression of joint damage. He will need close outpatient follow up with his rheumatologist for further evaluation if his pain does not resolve. to attain and maintain goal BP. continue with enalapril to prevent arrhythmia, shift potassium into the cells, and enhance elimination of potassium from the body to remain infection free If MRI suggests septic joint, tx empirically with vancomycin 1 gm iv q 12 for three additional weeks. Weekly cbc, bmp, vanco trough faxed to me at [phone] Dr. Kessler - infectious disease 2/2 R wrist/hand cellulitis /synovitis He will need close outpatient follow up with his rheumatologist for further evaluation if his pain does not resolve. Please follow up with Dr. Abraham. Please call to make an appoitnment within 1 week of discharge.  Please hold methotrexate until infection resolves.  Please follow up with your pcp within 1 week of discharge.  Please call to make an appointment within 1 week of discharge.  Please complete course of antibiotics as prescribed.  Please follow up with Dr. Kessler Within 1-2 weeks of discharge.  Please call to make an appointment. Please send weekly labs   cbc, bmp, vanco through faxed to me at [phone]. Continue pain management . He will need close outpatient follow up with his rheumatologist for further evaluation if his pain does not resolve. Please follow up with Dr. Abraham. Please call to make an appoitnment within 1 week of discharge.  Please hold methotrexate until infection resolves.  Please follow up with your pcp within 1 week of discharge.  Please call to make an appointment within 1 week of discharge.  Please complete course of antibiotics as prescribed through 10/28.  Please follow up with Dr. Kessler Within 1-2 weeks of discharge.  Please call to make an appointment. Please send weekly labs   cbc, bmp, vanco through faxed to me at [phone]. Continue pain management .

## 2017-10-13 VITALS — DIASTOLIC BLOOD PRESSURE: 98 MMHG | SYSTOLIC BLOOD PRESSURE: 130 MMHG

## 2017-10-13 LAB
BACTERIA BLD CULT: SIGNIFICANT CHANGE UP
BACTERIA BLD CULT: SIGNIFICANT CHANGE UP

## 2017-10-13 PROCEDURE — 99239 HOSP IP/OBS DSCHRG MGMT >30: CPT

## 2017-10-13 RX ORDER — METHOTREXATE 2.5 MG/1
0 TABLET ORAL
Qty: 0 | Refills: 0 | COMMUNITY

## 2017-10-13 RX ORDER — NICOTINE POLACRILEX 2 MG
1 GUM BUCCAL
Qty: 30 | Refills: 0 | OUTPATIENT
Start: 2017-10-13 | End: 2017-11-12

## 2017-10-13 RX ADMIN — Medication 1 MILLIGRAM(S): at 12:44

## 2017-10-13 RX ADMIN — OXYCODONE AND ACETAMINOPHEN 1 TABLET(S): 5; 325 TABLET ORAL at 01:10

## 2017-10-13 RX ADMIN — Medication 250 MILLIGRAM(S): at 05:03

## 2017-10-13 RX ADMIN — Medication 650 MILLIGRAM(S): at 09:51

## 2017-10-13 RX ADMIN — OXYCODONE AND ACETAMINOPHEN 1 TABLET(S): 5; 325 TABLET ORAL at 00:40

## 2017-10-13 RX ADMIN — Medication 1 PATCH: at 12:00

## 2017-10-13 RX ADMIN — Medication 1 PATCH: at 12:44

## 2017-10-13 RX ADMIN — Medication 10 MILLIGRAM(S): at 12:44

## 2017-10-13 RX ADMIN — Medication 650 MILLIGRAM(S): at 10:21

## 2017-10-13 NOTE — PROGRESS NOTE ADULT - SUBJECTIVE AND OBJECTIVE BOX
Patient is a 52y old  Male who presents with a chief complaint of Right wrist pain , swelling, erythema (08 Oct 2017 13:41)      SUBJECTIVE / OVERNIGHT EVENTS:  Pt reports much improvement with pain in the wrist. Able to move his fingers Denies fever, chills       MEDICATIONS  (STANDING):  enalapril 10 milliGRAM(s) Oral daily  folic acid 1 milliGRAM(s) Oral daily  nicotine - 21 mG/24Hr(s) Patch 1 patch Transdermal daily  vancomycin  IVPB 1000 milliGRAM(s) IV Intermittent every 12 hours    MEDICATIONS  (PRN):  acetaminophen   Tablet. 650 milliGRAM(s) Oral every 6 hours PRN Moderate Pain (4 - 6)  fluocinonide 0.05% Ointment 1 Application(s) Topical daily PRN Psoriatic Rash  oxyCODONE    5 mG/acetaminophen 325 mG 1 Tablet(s) Oral every 4 hours PRN Severe Pain (7 - 10)      Vital Signs Last 24 Hrs  T(C): 37 (13 Oct 2017 04:52), Max: 37.2 (12 Oct 2017 20:46)  T(F): 98.6 (13 Oct 2017 04:52), Max: 99 (12 Oct 2017 20:46)  HR: 83 (13 Oct 2017 04:52) (83 - 99)  BP: 110/76 (13 Oct 2017 04:52) (110/76 - 143/75)  BP(mean): --  RR: 17 (13 Oct 2017 04:52) (16 - 17)  SpO2: 100% (13 Oct 2017 04:52) (99% - 100%)    I&O's Summary      PHYSICAL EXAM:  GENERAL: NAD, well-developed  HEAD:  Atraumatic, Normocephalic  EYES: EOMI, PERRLA, conjunctiva and sclera clear  NECK: Supple, No JVD  CHEST/LUNG: Clear to auscultation bilaterally; No wheeze  HEART: Regular rate and rhythm; No murmurs, rubs, or gallops  ABDOMEN: Soft, Nontender, Nondistended; Bowel sounds present  EXTREMITIES:  2+ Peripheral Pulses, No clubbing, cyanosis, or edema  PSYCH: AAOx3  NEUROLOGY: non-focal  SKIN: psoriatic rash on back chest, umbilical area, L knee, and arm.   MSK: Right hand   erythema - improving.minimal swelling   No purulent discharge. + TTP  Arthritis deformation of some PIP joints        LABS:                                              14.2   12.90 )-----------( 431      ( 12 Oct 2017 05:30 )             43.8       RADIOLOGY & ADDITIONAL TESTS:    Imaging Personally Reviewed:    Consultant(s) Notes Reviewed:      Care Discussed with Consultants/Other Providers: ID

## 2017-10-13 NOTE — PROGRESS NOTE ADULT - PROBLEM SELECTOR PLAN 1
2/2 R wrist/hand cellulitis /synovitis (pt w/ HR > 100 and leukocytosis). Xray R hand reviewed w/ no acute findings. ED attempted to drain area but not fluid to drain.   Blood cxs sent  however pt already on abx limiting yield even further   On  IV clincamydin changed to IV   Vanco    Pain improving.  Pain control w/ tylenol PRN /Percocet for pain control  Hand sx recs appreciated, no surgical intervention at this time  MRI results reviewed - Dorsal hand and wrist cellulitic changes.  Findings compatible with predominately 4th extensor compartment   tenosynovitis and joint capsule synovitis along radial aspect of the   wrist and volar aspect of the distal radioulnar joint.  No MR evidence for osteomyelitis or focal drainable abscess collections.  DW ID- plan to change to vacno 1 gm iv q 12 through 10/28  weekly cbc, bmp , vanco through fax to [phone]

## 2017-10-13 NOTE — PROGRESS NOTE ADULT - PROBLEM SELECTOR PLAN 2
Team dw  Dr. Abraham, private Rheum   Pts dosage is Methotrexate 15 mg q weekly on Tuesdays. Rheum on board- Hold off MTX FU  Pt to FU with Dr Abraham as outpt Team dw  Dr. Abraham, private Rheum   Pts dosage is Methotrexate 15 mg q weekly on Tuesdays. Rheum on board- Hold off MTX given infection  Pt to FU with Dr Abraham as outpt

## 2017-10-13 NOTE — PROGRESS NOTE ADULT - PROBLEM SELECTOR PLAN 5
Improve score of 0. Low risk for VTE. Would encourage ambulation. SCDs if not ambulating  Dispo - DC Planning to home  40 mins  DC Planning 40  mins Improve score of 0. Low risk for VTE. Would encourage ambulation. SCDs if not ambulating  Dispo - DC Planning to home  40 mins  pt to follow with PMD Dr Fuchs for vanco monitoring

## 2017-10-28 ENCOUNTER — INPATIENT (INPATIENT)
Facility: HOSPITAL | Age: 52
LOS: 1 days | Discharge: HOME | End: 2017-10-30
Attending: HOSPITALIST | Admitting: HOSPITALIST
Payer: COMMERCIAL

## 2017-10-28 VITALS
SYSTOLIC BLOOD PRESSURE: 116 MMHG | HEART RATE: 108 BPM | OXYGEN SATURATION: 100 % | RESPIRATION RATE: 16 BRPM | DIASTOLIC BLOOD PRESSURE: 79 MMHG | TEMPERATURE: 99 F

## 2017-10-28 DIAGNOSIS — Z29.9 ENCOUNTER FOR PROPHYLACTIC MEASURES, UNSPECIFIED: ICD-10-CM

## 2017-10-28 DIAGNOSIS — M25.531 PAIN IN RIGHT WRIST: ICD-10-CM

## 2017-10-28 DIAGNOSIS — L03.90 CELLULITIS, UNSPECIFIED: ICD-10-CM

## 2017-10-28 DIAGNOSIS — L40.50 ARTHROPATHIC PSORIASIS, UNSPECIFIED: ICD-10-CM

## 2017-10-28 DIAGNOSIS — A41.9 SEPSIS, UNSPECIFIED ORGANISM: ICD-10-CM

## 2017-10-28 DIAGNOSIS — I10 ESSENTIAL (PRIMARY) HYPERTENSION: ICD-10-CM

## 2017-10-28 LAB
ALBUMIN SERPL ELPH-MCNC: 4.2 G/DL — SIGNIFICANT CHANGE UP (ref 3.3–5)
ALP SERPL-CCNC: 70 U/L — SIGNIFICANT CHANGE UP (ref 40–120)
ALT FLD-CCNC: 61 U/L — HIGH (ref 4–41)
ANISOCYTOSIS BLD QL: SLIGHT — SIGNIFICANT CHANGE UP
APPEARANCE UR: CLEAR — SIGNIFICANT CHANGE UP
AST SERPL-CCNC: 40 U/L — SIGNIFICANT CHANGE UP (ref 4–40)
B PERT DNA SPEC QL NAA+PROBE: SIGNIFICANT CHANGE UP
BASE EXCESS BLDV CALC-SCNC: -1.9 MMOL/L — SIGNIFICANT CHANGE UP
BASOPHILS # BLD AUTO: 0.01 K/UL — SIGNIFICANT CHANGE UP (ref 0–0.2)
BASOPHILS NFR BLD AUTO: 0.3 % — SIGNIFICANT CHANGE UP (ref 0–2)
BASOPHILS NFR SPEC: 0 % — SIGNIFICANT CHANGE UP (ref 0–2)
BILIRUB SERPL-MCNC: 0.3 MG/DL — SIGNIFICANT CHANGE UP (ref 0.2–1.2)
BILIRUB UR-MCNC: NEGATIVE — SIGNIFICANT CHANGE UP
BLOOD GAS VENOUS - CREATININE: 0.92 MG/DL — SIGNIFICANT CHANGE UP (ref 0.5–1.3)
BLOOD UR QL VISUAL: NEGATIVE — SIGNIFICANT CHANGE UP
BUN SERPL-MCNC: 12 MG/DL — SIGNIFICANT CHANGE UP (ref 7–23)
C PNEUM DNA SPEC QL NAA+PROBE: NOT DETECTED — SIGNIFICANT CHANGE UP
CALCIUM SERPL-MCNC: 8.5 MG/DL — SIGNIFICANT CHANGE UP (ref 8.4–10.5)
CHLORIDE BLDV-SCNC: 101 MMOL/L — SIGNIFICANT CHANGE UP (ref 96–108)
CHLORIDE SERPL-SCNC: 98 MMOL/L — SIGNIFICANT CHANGE UP (ref 98–107)
CO2 SERPL-SCNC: 21 MMOL/L — LOW (ref 22–31)
COLOR SPEC: YELLOW — SIGNIFICANT CHANGE UP
CREAT SERPL-MCNC: 1.03 MG/DL — SIGNIFICANT CHANGE UP (ref 0.5–1.3)
EOSINOPHIL # BLD AUTO: 0.2 K/UL — SIGNIFICANT CHANGE UP (ref 0–0.5)
EOSINOPHIL NFR BLD AUTO: 6 % — SIGNIFICANT CHANGE UP (ref 0–6)
EOSINOPHIL NFR FLD: 3 % — SIGNIFICANT CHANGE UP (ref 0–6)
FLUAV H1 2009 PAND RNA SPEC QL NAA+PROBE: NOT DETECTED — SIGNIFICANT CHANGE UP
FLUAV H1 RNA SPEC QL NAA+PROBE: NOT DETECTED — SIGNIFICANT CHANGE UP
FLUAV H3 RNA SPEC QL NAA+PROBE: NOT DETECTED — SIGNIFICANT CHANGE UP
FLUAV SUBTYP SPEC NAA+PROBE: SIGNIFICANT CHANGE UP
FLUBV RNA SPEC QL NAA+PROBE: NOT DETECTED — SIGNIFICANT CHANGE UP
GAS PNL BLDV: 130 MMOL/L — LOW (ref 136–146)
GIANT PLATELETS BLD QL SMEAR: PRESENT — SIGNIFICANT CHANGE UP
GLUCOSE BLDV-MCNC: 133 — HIGH (ref 70–99)
GLUCOSE SERPL-MCNC: 121 MG/DL — HIGH (ref 70–99)
GLUCOSE UR-MCNC: NEGATIVE — SIGNIFICANT CHANGE UP
HADV DNA SPEC QL NAA+PROBE: NOT DETECTED — SIGNIFICANT CHANGE UP
HCO3 BLDV-SCNC: 23 MMOL/L — SIGNIFICANT CHANGE UP (ref 20–27)
HCOV 229E RNA SPEC QL NAA+PROBE: NOT DETECTED — SIGNIFICANT CHANGE UP
HCOV HKU1 RNA SPEC QL NAA+PROBE: NOT DETECTED — SIGNIFICANT CHANGE UP
HCOV NL63 RNA SPEC QL NAA+PROBE: NOT DETECTED — SIGNIFICANT CHANGE UP
HCOV OC43 RNA SPEC QL NAA+PROBE: NOT DETECTED — SIGNIFICANT CHANGE UP
HCT VFR BLD CALC: 44.5 % — SIGNIFICANT CHANGE UP (ref 39–50)
HCT VFR BLDV CALC: 45.5 % — SIGNIFICANT CHANGE UP (ref 39–51)
HGB BLD-MCNC: 14.4 G/DL — SIGNIFICANT CHANGE UP (ref 13–17)
HGB BLDV-MCNC: 14.8 G/DL — SIGNIFICANT CHANGE UP (ref 13–17)
HMPV RNA SPEC QL NAA+PROBE: NOT DETECTED — SIGNIFICANT CHANGE UP
HPIV1 RNA SPEC QL NAA+PROBE: NOT DETECTED — SIGNIFICANT CHANGE UP
HPIV2 RNA SPEC QL NAA+PROBE: NOT DETECTED — SIGNIFICANT CHANGE UP
HPIV3 RNA SPEC QL NAA+PROBE: NOT DETECTED — SIGNIFICANT CHANGE UP
HPIV4 RNA SPEC QL NAA+PROBE: NOT DETECTED — SIGNIFICANT CHANGE UP
IMM GRANULOCYTES # BLD AUTO: 0.02 # — SIGNIFICANT CHANGE UP
IMM GRANULOCYTES NFR BLD AUTO: 0.6 % — SIGNIFICANT CHANGE UP (ref 0–1.5)
KETONES UR-MCNC: NEGATIVE — SIGNIFICANT CHANGE UP
LACTATE BLDV-MCNC: 1.3 MMOL/L — SIGNIFICANT CHANGE UP (ref 0.5–2)
LEUKOCYTE ESTERASE UR-ACNC: NEGATIVE — SIGNIFICANT CHANGE UP
LYMPHOCYTES # BLD AUTO: 0.7 K/UL — LOW (ref 1–3.3)
LYMPHOCYTES # BLD AUTO: 21.1 % — SIGNIFICANT CHANGE UP (ref 13–44)
LYMPHOCYTES NFR SPEC AUTO: 13 % — SIGNIFICANT CHANGE UP (ref 13–44)
M PNEUMO DNA SPEC QL NAA+PROBE: NOT DETECTED — SIGNIFICANT CHANGE UP
MCHC RBC-ENTMCNC: 23.4 PG — LOW (ref 27–34)
MCHC RBC-ENTMCNC: 32.4 % — SIGNIFICANT CHANGE UP (ref 32–36)
MCV RBC AUTO: 72.4 FL — LOW (ref 80–100)
MICROCYTES BLD QL: SIGNIFICANT CHANGE UP
MONOCYTES # BLD AUTO: 0.43 K/UL — SIGNIFICANT CHANGE UP (ref 0–0.9)
MONOCYTES NFR BLD AUTO: 13 % — SIGNIFICANT CHANGE UP (ref 2–14)
MONOCYTES NFR BLD: 11 % — HIGH (ref 2–9)
MUCOUS THREADS # UR AUTO: SIGNIFICANT CHANGE UP
NEUTROPHIL AB SER-ACNC: 60 % — SIGNIFICANT CHANGE UP (ref 43–77)
NEUTROPHILS # BLD AUTO: 1.95 K/UL — SIGNIFICANT CHANGE UP (ref 1.8–7.4)
NEUTROPHILS NFR BLD AUTO: 59 % — SIGNIFICANT CHANGE UP (ref 43–77)
NEUTS BAND # BLD: 10 % — HIGH (ref 0–6)
NITRITE UR-MCNC: NEGATIVE — SIGNIFICANT CHANGE UP
NON-SQ EPI CELLS # UR AUTO: <1 — SIGNIFICANT CHANGE UP
NRBC # FLD: 0 — SIGNIFICANT CHANGE UP
PCO2 BLDV: 34 MMHG — LOW (ref 41–51)
PH BLDV: 7.43 PH — SIGNIFICANT CHANGE UP (ref 7.32–7.43)
PH UR: 6.5 — SIGNIFICANT CHANGE UP (ref 4.6–8)
PLATELET # BLD AUTO: 247 K/UL — SIGNIFICANT CHANGE UP (ref 150–400)
PLATELET COUNT - ESTIMATE: NORMAL — SIGNIFICANT CHANGE UP
PMV BLD: 9.2 FL — SIGNIFICANT CHANGE UP (ref 7–13)
PO2 BLDV: 39 MMHG — SIGNIFICANT CHANGE UP (ref 35–40)
POTASSIUM BLDV-SCNC: 3.8 MMOL/L — SIGNIFICANT CHANGE UP (ref 3.4–4.5)
POTASSIUM SERPL-MCNC: 4.1 MMOL/L — SIGNIFICANT CHANGE UP (ref 3.5–5.3)
POTASSIUM SERPL-SCNC: 4.1 MMOL/L — SIGNIFICANT CHANGE UP (ref 3.5–5.3)
PROT SERPL-MCNC: 7.6 G/DL — SIGNIFICANT CHANGE UP (ref 6–8.3)
PROT UR-MCNC: 20 — SIGNIFICANT CHANGE UP
RBC # BLD: 6.15 M/UL — HIGH (ref 4.2–5.8)
RBC # FLD: 15.3 % — HIGH (ref 10.3–14.5)
RBC CASTS # UR COMP ASSIST: SIGNIFICANT CHANGE UP (ref 0–?)
RSV RNA SPEC QL NAA+PROBE: NOT DETECTED — SIGNIFICANT CHANGE UP
RV+EV RNA SPEC QL NAA+PROBE: NOT DETECTED — SIGNIFICANT CHANGE UP
SAO2 % BLDV: 72.2 % — SIGNIFICANT CHANGE UP (ref 60–85)
SODIUM SERPL-SCNC: 134 MMOL/L — LOW (ref 135–145)
SP GR SPEC: 1.02 — SIGNIFICANT CHANGE UP (ref 1–1.03)
SQUAMOUS # UR AUTO: SIGNIFICANT CHANGE UP
UROBILINOGEN FLD QL: NORMAL E.U. — SIGNIFICANT CHANGE UP (ref 0.1–0.2)
VARIANT LYMPHS # BLD: 3 % — SIGNIFICANT CHANGE UP
WBC # BLD: 3.31 K/UL — LOW (ref 3.8–10.5)
WBC # FLD AUTO: 3.31 K/UL — LOW (ref 3.8–10.5)
WBC UR QL: SIGNIFICANT CHANGE UP (ref 0–?)

## 2017-10-28 PROCEDURE — 99254 IP/OBS CNSLTJ NEW/EST MOD 60: CPT | Mod: GC

## 2017-10-28 PROCEDURE — 99223 1ST HOSP IP/OBS HIGH 75: CPT | Mod: GC

## 2017-10-28 PROCEDURE — 73110 X-RAY EXAM OF WRIST: CPT | Mod: 26,RT

## 2017-10-28 PROCEDURE — 71020: CPT | Mod: 26

## 2017-10-28 RX ORDER — ACETAMINOPHEN 500 MG
650 TABLET ORAL EVERY 6 HOURS
Qty: 0 | Refills: 0 | Status: DISCONTINUED | OUTPATIENT
Start: 2017-10-28 | End: 2017-10-30

## 2017-10-28 RX ORDER — HALOBETASOL PROPIONATE 0.5 MG/G
0 OINTMENT TOPICAL
Qty: 0 | Refills: 0 | COMMUNITY

## 2017-10-28 RX ORDER — NICOTINE POLACRILEX 2 MG
1 GUM BUCCAL DAILY
Qty: 0 | Refills: 0 | Status: DISCONTINUED | OUTPATIENT
Start: 2017-10-28 | End: 2017-10-30

## 2017-10-28 RX ORDER — FOLIC ACID 0.8 MG
1 TABLET ORAL
Qty: 0 | Refills: 0 | COMMUNITY

## 2017-10-28 RX ORDER — PIPERACILLIN AND TAZOBACTAM 4; .5 G/20ML; G/20ML
3.38 INJECTION, POWDER, LYOPHILIZED, FOR SOLUTION INTRAVENOUS EVERY 8 HOURS
Qty: 0 | Refills: 0 | Status: DISCONTINUED | OUTPATIENT
Start: 2017-10-28 | End: 2017-10-28

## 2017-10-28 RX ORDER — SODIUM CHLORIDE 9 MG/ML
1000 INJECTION INTRAMUSCULAR; INTRAVENOUS; SUBCUTANEOUS
Qty: 0 | Refills: 0 | Status: DISCONTINUED | OUTPATIENT
Start: 2017-10-28 | End: 2017-10-30

## 2017-10-28 RX ORDER — VANCOMYCIN HCL 1 G
1000 VIAL (EA) INTRAVENOUS EVERY 12 HOURS
Qty: 0 | Refills: 0 | Status: DISCONTINUED | OUTPATIENT
Start: 2017-10-28 | End: 2017-10-28

## 2017-10-28 RX ORDER — OXYCODONE HYDROCHLORIDE 5 MG/1
5 TABLET ORAL EVERY 6 HOURS
Qty: 0 | Refills: 0 | Status: DISCONTINUED | OUTPATIENT
Start: 2017-10-28 | End: 2017-10-30

## 2017-10-28 RX ORDER — FLUOCINONIDE/EMOLLIENT BASE 0.05 %
1 CREAM (GRAM) TOPICAL DAILY
Qty: 0 | Refills: 0 | Status: DISCONTINUED | OUTPATIENT
Start: 2017-10-28 | End: 2017-10-30

## 2017-10-28 RX ORDER — VANCOMYCIN HCL 1 G
1000 VIAL (EA) INTRAVENOUS ONCE
Qty: 0 | Refills: 0 | Status: COMPLETED | OUTPATIENT
Start: 2017-10-28 | End: 2017-10-28

## 2017-10-28 RX ORDER — FOLIC ACID 0.8 MG
1 TABLET ORAL DAILY
Qty: 0 | Refills: 0 | Status: DISCONTINUED | OUTPATIENT
Start: 2017-10-28 | End: 2017-10-30

## 2017-10-28 RX ADMIN — PIPERACILLIN AND TAZOBACTAM 25 GRAM(S): 4; .5 INJECTION, POWDER, LYOPHILIZED, FOR SOLUTION INTRAVENOUS at 13:08

## 2017-10-28 RX ADMIN — SODIUM CHLORIDE 75 MILLILITER(S): 9 INJECTION INTRAMUSCULAR; INTRAVENOUS; SUBCUTANEOUS at 12:56

## 2017-10-28 RX ADMIN — Medication 1 PATCH: at 16:47

## 2017-10-28 RX ADMIN — Medication 650 MILLIGRAM(S): at 08:37

## 2017-10-28 RX ADMIN — Medication 250 MILLIGRAM(S): at 06:51

## 2017-10-28 RX ADMIN — SODIUM CHLORIDE 75 MILLILITER(S): 9 INJECTION INTRAMUSCULAR; INTRAVENOUS; SUBCUTANEOUS at 21:06

## 2017-10-28 RX ADMIN — Medication 650 MILLIGRAM(S): at 15:38

## 2017-10-28 RX ADMIN — Medication 1 MILLIGRAM(S): at 13:12

## 2017-10-28 RX ADMIN — Medication 10 MILLIGRAM(S): at 16:45

## 2017-10-28 NOTE — H&P ADULT - PROBLEM SELECTOR PLAN 2
-Workup as above  -Previous MRI had shown cellulitis, with tenosynovitis and synovitis. Per notes, hand surgery had been consulted for washout but did not recommend it. F/u workup as above, and may need hand surgery depending on status -Workup as above  -Previous MRI had shown cellulitis, with tenosynovitis and synovitis. Per notes, hand surgery had been consulted for washout but did not recommend it. F/u workup as above, and may need hand surgery depending on status  -Rheum consult

## 2017-10-28 NOTE — ED PROVIDER NOTE - FAMILY HISTORY
Mother  Still living? Unknown  Family history of rheumatoid arthritis, Age at diagnosis: Age Unknown  Family history of hypertension, Age at diagnosis: Age Unknown

## 2017-10-28 NOTE — ED ADULT NURSE NOTE - OBJECTIVE STATEMENT
pt on bed aox3 reports fever since Monday. Pt reports has Psoriatic Arthritis and getting steroids/pain medication shot on the Right wrist last Oct 4, Seen here after 3 days due to swelling and pain, Admitted r/o Sepsis and D/C on vancomycin IV thru PICC line on Left arm,But since Monday having fever and did not go away, per RN who visited him, needs to go to ED if having fever. denies SOB cough Dysuria N V Abdominal pain CP HA dizziness

## 2017-10-28 NOTE — H&P ADULT - NSHPLABSRESULTS_GEN_ALL_CORE
14.4   3.31  )-----------( 247      ( 28 Oct 2017 05:00 )             44.5     WBC Trend: 3.31<--  10-28    Bands: 10%    134<L>  |  98  |  12  ----------------------------<  121<H>  4.1   |  21<L>  |  1.03    Ca    8.5      28 Oct 2017 05:00    TPro  7.6  /  Alb  4.2  /  TBili  0.3  /  DBili  x   /  AST  40  /  ALT  61<H>  /  AlkPhos  70  10-28    Creatinine Trend: 1.03<--, 0.89<--, 0.90<--, 0.99<--, 0.98<--, 1.01<--        Urinalysis Basic - ( 28 Oct 2017 08:20 )    Color: YELLOW / Appearance: CLEAR / S.020 / pH: 6.5  Gluc: NEGATIVE / Ketone: NEGATIVE  / Bili: NEGATIVE / Urobili: NORMAL E.U.   Blood: NEGATIVE / Protein: 20 / Nitrite: NEGATIVE   Leuk Esterase: NEGATIVE / RBC: 0-2 / WBC 0-2   Sq Epi: OCC / Non Sq Epi: x / Bacteria: x          RADIOLOGY & ADDITIONAL TESTS:    Imaging Personally Reviewed:    < from: Xray Chest 2 Views PA/Lat (10.28.17 @ 06:33) >    IMPRESSION:   Left upper extremity PICC with tip in the SVC.  The lungs are clear.    < end of copied text >

## 2017-10-28 NOTE — ED PROVIDER NOTE - OBJECTIVE STATEMENT
52 y.o M with HTN and psoriatic arthritis, recent admission for right wrist cellulitis s/p picc line placement now presenting with 1 week of fevers/chills. Pt reported that since his discharge he has been taking vancomycin twice daily and feeling well up to 1 week after discharge. Soon after he began experiencing  decreased mobility in his right wrist and higher fevers. 53 y/o M with HTN and psoriatic arthritis, recent admission for right wrist cellulitis s/p picc line placement now presenting with 1 week of fevers/chills. Pt reported that since his discharge he has been taking vancomycin twice daily and feeling well up to 1 week after discharge. Soon after he began experiencing  decreased mobility in his right wrist and higher fevers.

## 2017-10-28 NOTE — CONSULT NOTE ADULT - ATTENDING COMMENTS
51 yo man with psoriasis recent admission for right wrist swelling with MRI suspicious for tenosynovitis.  He was d/c ed home on IV Vancomycin scheduled to complete today but returns with fever x several day.  On exam non toxic, right wrist with minimal swelling, without erythema.  WBC 3.3  May represent drug fever due to vancomycin.    Would d/c vanco and zosyn  Would d/c PIC  Follow blood cultures

## 2017-10-28 NOTE — H&P ADULT - NSHPPHYSICALEXAM_GEN_ALL_CORE
Vital Signs Last 24 Hrs  T(C): 37.1 (28 Oct 2017 10:17), Max: 39 (28 Oct 2017 05:14)  T(F): 98.7 (28 Oct 2017 10:17), Max: 102.2 (28 Oct 2017 05:14)  HR: 85 (28 Oct 2017 10:17) (80 - 108)  BP: 106/74 (28 Oct 2017 10:17) (103/70 - 122/70)  BP(mean): --  RR: 17 (28 Oct 2017 10:17) (16 - 17)  SpO2: 99% (28 Oct 2017 10:17) (99% - 100%)    PHYSICAL EXAM:  GENERAL: NAD, well-developed  HEAD:  Atraumatic, Normocephalic  EYES: EOMI,  conjunctiva and sclera clear  NECK: Supple  CHEST/LUNG: Clear to auscultation bilaterally; No wheeze  HEART: Regular rate and rhythm; No murmurs, rubs, or gallops  ABDOMEN: Soft, Nontender, Nondistended; Bowel sounds present  MSK: Patient with swelling present on dorsal aspect of wrist, pain present with flexion and extension of wrist. No swelling noted in other joints or TTP.   PSYCH: AAOx3  NEUROLOGY: non-focal  SKIN: Psoriatic patches present Vital Signs Last 24 Hrs  T(C): 37.1 (28 Oct 2017 10:17), Max: 39 (28 Oct 2017 05:14)  T(F): 98.7 (28 Oct 2017 10:17), Max: 102.2 (28 Oct 2017 05:14)  HR: 85 (28 Oct 2017 10:17) (80 - 108)  BP: 106/74 (28 Oct 2017 10:17) (103/70 - 122/70)  BP(mean): --  RR: 17 (28 Oct 2017 10:17) (16 - 17)  SpO2: 99% (28 Oct 2017 10:17) (99% - 100%)    PHYSICAL EXAM:  GENERAL: NAD, well-developed  HEAD:  Atraumatic, Normocephalic  EYES: EOMI,  conjunctiva and sclera clear  NECK: Supple  CHEST/LUNG: Clear to auscultation bilaterally; No wheeze  HEART: Regular rate and rhythm; No murmurs, rubs, or gallops  ABDOMEN: Soft, Nontender, Nondistended; Bowel sounds present  MSK: Patient with swelling, minimal erythema and tenderness on dorsal aspect of wrist, pain present with flexion and extension of wrist. No swelling noted in other joints or TTP.   PSYCH: AAOx3  NEUROLOGY: non-focal  SKIN: Psoriatic patches present

## 2017-10-28 NOTE — H&P ADULT - NSHPSOCIALHISTORY_GEN_ALL_CORE
Lives at home with wife and children. Works as an LPN. Has been not smoking since last admission on 10/7. Smoked 1 ppd x 30 years previously. Social alcohol use. Lives at home with wife and children. Works as an LPN, ( home care nurse) Has been not smoking since last admission on 10/7. Smoked 1 ppd x 30 years previously. Social alcohol use, no drug use

## 2017-10-28 NOTE — H&P ADULT - NSHPREVIEWOFSYSTEMS_GEN_ALL_CORE
Review of Systems:   CONSTITUTIONAL: +fevers, +chills  EYES: No eye pain, visual disturbances, or discharge  ENMT:  No difficulty hearing, tinnitus, vertigo; No sinus or throat pain  NECK: No pain or stiffness  RESPIRATORY: No cough, wheezing; No shortness of breath  CARDIOVASCULAR: No chest pain, palpitations, dizziness, or leg swelling  GASTROINTESTINAL: No abdominal or epigastric pain. No nausea, vomiting, or hematemesis; No diarrhea or constipation. No melena or hematochezia.  GENITOURINARY: No dysuria, frequency, hematuria, or incontinence  NEUROLOGICAL: No headaches, memory loss, loss of strength, numbness, or tremors  SKIN: No itching, burning; stable psoriatic patches  ENDOCRINE: No heat or cold intolerance;   MUSCULOSKELETAL: +joint pain with fevers  HEME/LYMPH: No easy bruising, or bleeding gums

## 2017-10-28 NOTE — ED PROVIDER NOTE - ATTENDING CONTRIBUTION TO CARE
I was physically present for the E/M service provided. I agree with above history, physical, and plan which I have reviewed and edited where appropriate. I was physically present for the key portions of the service provided.    52M with right wrist infection s/p steroid injection discharged with PICC line on Vancomycin on 10/13/17 p/w one week of fever, taking tylenol, and increased wrist pain. No URI, No GI Sx. I was physically present for the E/M service provided. I agree with above history, physical, and plan which I have reviewed and edited where appropriate. I was physically present for the key portions of the service provided.    52M with right wrist infection s/p steroid injection discharged with PICC line on Vancomycin on 10/13/17 p/w one week of fever, taking Tylenol, and increased wrist pain. No URI, No GI Sx. Rectal temp 102. LUngs CTA. Non-toxic appearing. Will pa-culture and admit for ID consultation.

## 2017-10-28 NOTE — H&P ADULT - ATTENDING COMMENTS
Pt seen and examined at bedside, agree with H&P done by HS, edited as appropriate, briefly this is a 53 yo M with hx of HTN and psoriatic arthritis, with recent admission/d/c on 10/13 for sepsis 2/2 R. wrist cellulitis in setting of steroid injection, a/w sepsis secondary to R. wrist infection/cellulitis.  Exam: as above  Plan: cont vanco, add zosyn for broader coverage, ID and rheum consult, f/u cultures, may need hand sx consult as well pending recs from ID and rheum, cont home meds for htn, cont steroid cream for psoriasis, hold methotrexate in the setting of infection, Plan discussed with HS. Pt seen and examined at bedside, agree with H&P done by HS, edited as appropriate, briefly this is a 53 yo M with hx of HTN and psoriatic arthritis, with recent admission/d/c on 10/13 for sepsis 2/2 R. wrist cellulitis in setting of steroid injection, a/w sepsis secondary to R. wrist infection/cellulitis.  Exam: as above  Plan: cont vanco, add zosyn for broader coverage, ID and rheum consult, f/u cultures, may need hand sx consult as well pending recs from ID and Rheum, cont home meds for htn, cont steroid cream for psoriasis, hold methotrexate in the setting of infection, Plan discussed with HS.

## 2017-10-28 NOTE — H&P ADULT - PROBLEM SELECTOR PLAN 1
Patient with fevers, WBC with 10% bandemia, with worsening R. wrist pain and swelling concerning for infected joint although patient had been on abx  -At last admission, also concern for gout, psoriatic flare, but infectious joint had been more likely. ID consult, consider rheum consult.   -UA, CXR negative. Blood cx sent. May be a role for joint tap. Patient with fevers, WBC with 10% bandemia, with worsening R. wrist pain and swelling concerning for infected joint although patient had been on abx  -At last admission, also concern for gout, psoriatic flare, but infectious joint had been more likely. Patient had been doing well on clindamycin inpatient and vancomycin. ID consult for choice of abx in setting of breakthrough fevers, consider rheum consult.   -UA, CXR negative. Blood cx sent. May be a role for joint tap. Patient with fevers, WBC with 10% bandemia, with worsening R. wrist pain and swelling concerning for infected joint although patient had been on abx  -At last admission, also concern for gout, psoriatic flare, but infectious joint had been more likely. Patient had been doing well on clindamycin inpatient and vancomycin. ID consult for choice of abx in setting of breakthrough fevers. Will also start zosyn for GNR coverage and continue vancomycin.   -UA, CXR negative. Blood cx sent. May be a role for joint tap. Patient with fevers, WBC with 10% bandemia, with worsening R. wrist pain and swelling concerning for infected joint although patient had been on abx  -At last admission, also concern for gout, psoriatic flare, but infectious joint had been more likely. Patient had been doing well on clindamycin inpatient and vancomycin. ID consult for choice of abx in setting of breakthrough fevers. Will also start zosyn for GN coverage and continue vancomycin.   -UA, CXR negative. Blood cx sent. May be a role for joint tap.

## 2017-10-28 NOTE — H&P ADULT - ASSESSMENT
53 yo M with hx of HTN and psoriatic arthritis, with recent admission d/c on 10/13 for sepsis 2/2 R. R. wrist cellulitis in setting of steroid injection, presents for persistent fevers and worsening R. wrist pain and swelling at home 53 yo M with hx of HTN and psoriatic arthritis, with recent admission d/c on 10/13 for sepsis 2/2 R. wrist cellulitis in setting of steroid injection, presents for persistent fevers and worsening R. wrist pain and swelling at home 51 yo M with hx of HTN and psoriatic arthritis, with recent admission d/c on 10/13 for sepsis 2/2 R. wrist cellulitis in setting of steroid injection, presents for persistent fevers and worsening R. wrist pain and swelling at home.

## 2017-10-28 NOTE — ED ADULT TRIAGE NOTE - CHIEF COMPLAINT QUOTE
pt. c/o intermittent  fever + chills  since Tuesday . Pt. states he has a known infection on right wrist , receiving IV vancomycin treatment ( has a picc line on left arm). Pt. states he was directed to come to the ED if fever goes over 100.4 . Pt. states his temp was 102.5 one hour and a half. Pt. denies taking any medication. Temp in triage 99.2 oral , denies chills at this time.

## 2017-10-28 NOTE — ED PROVIDER NOTE - MEDICAL DECISION MAKING DETAILS
52 y.o M with pmh HTN and psoriatic arthritis p/w fevers X 1 week and right wrist pain while on IV vancomycin. Plan to get CBC,BMP,Blood cultures. Will admit

## 2017-10-28 NOTE — CONSULT NOTE ADULT - SUBJECTIVE AND OBJECTIVE BOX
HPI:  51 yo M with hx of HTN and psoriatic arthritis came in c/o having  fevers at home. Patient was recently in the hospital from 10/8 - 10/13 admitted with sepsis 2/2 R. hand wrist/cellulitis in setting of recent steroid injection to R. wrist. MRI at that time had shown dorsal hand and wrist cellulitic changes, along with tenosynvoitis and joint capsule synovitis. Patient was treated with IV clindamycin and then changed to IV vancomycin on d/c with last day on 10/28. Patient had been in [facility] the week of discharge, but over this past week, patient says he had constant fevers of 102-104, which had improved with Tylenol at the time, but still reoccurred after a few hours after Tylenol given. Patient reports he has associated chills, joint pains at the time of fevers, and worsening R. hand wrist swelling since discharge (had improved during hospitalization). Patient also reports wrist pain with movement. All these symptoms have occurred over the past week. Patient was hoping fevers would go away, but nurse/CM told him if he should be evaluated in ER for persistent fevers. No complaints of any cough, sob, abdominal pain, diarrhea, constipation, dysuria, headaches or rash. Patient had psoriatic patches, but no change in patches.     In the ER: patient's BP: 116/79, HR of 108, Tmax of 102.2F rectal, RR of 16 with O2 sat of 100%. Patient had been given vancomycin x1. (28 Oct 2017 10:46)      PAST MEDICAL & SURGICAL HISTORY:  Psoriatic arthritis  Essential hypertension  No significant past surgical history      Allergies  No Known Allergies        ANTIMICROBIALS:  piperacillin/tazobactam IVPB. 3.375 every 8 hours  vancomycin  IVPB 1000 every 12 hours      OTHER MEDS:  acetaminophen   Tablet 650 milliGRAM(s) Oral every 6 hours PRN  enalapril 10 milliGRAM(s) Oral daily  fluocinonide 0.05% Ointment 1 Application(s) Topical daily PRN  folic acid 1 milliGRAM(s) Oral daily  nicotine - 21 mG/24Hr(s) Patch 1 patch Transdermal daily  oxyCODONE    IR 5 milliGRAM(s) Oral every 6 hours PRN  sodium chloride 0.9%. 1000 milliLiter(s) IV Continuous <Continuous>      SOCIAL HISTORY: [ ] etoh [ ] tobacco [ ] former smoker [ ] IVDU    FAMILY HISTORY:  Family history of hypertension (Mother)      REVIEW OF SYSTEMS  [  ] ROS unobtainable because:    [  ] All other systems negative except as noted below:	    Constitutional:  [ ] fever [ ] weight loss  Skin:  [ ] rash [ ] phlebitis	  Eyes: [ ] icterus [ ] inflammation	  ENMT: [ ] discharge [ ] thrush [ ] ulcers [ ] exudates  Respiratory: [ ] dyspnea [ ] hemoptysis [ ] cough [ ] sputum	  Cardiovascular:  [ ] chest pain [ ] palpitations [ ] edema	  Gastrointestinal:  [ ] nausea [ ] vomiting [ ] diarrhea [ ] constipation [ ] pain	  Genitourinary:  [ ] dysuria [ ] frequency [ ] hematuria [ ] discharge [ ] flank pain  Musculoskeletal:  [ ] myalgias [ ] arthralgias [ ] arthritis	  Neurological:  [ ] headache [ ] seizures	  Psychiatric:  [ ] anxiety [ ] depression	  Hematology/Lymphatics:  [ ] lymphadenopathy  Endocrine:  [ ] adrenal [ ] thyroid  Allergic/Immunologic:	 [ ] transplant [ ] seasonal    Vital Signs Last 24 Hrs  T(F): 98.7 (10-28-17 @ 10:17), Max: 102.2 (10-28-17 @ 05:14)    Vital Signs Last 24 Hrs  HR: 85 (10-28-17 @ 10:17) (80 - 108)  BP: 106/74 (10-28-17 @ 10:17) (103/70 - 122/70)  RR: 17 (10-28-17 @ 10:17)  SpO2: 99% (10-28-17 @ 10:17) (99% - 100%)  Wt(kg): --    PHYSICAL EXAM:  General: [ ] non-toxic  HEAD/EYES: [ ] PERRL [ ] white sclera [ ] icterus  ENT:  [ ] normal [ ] supple [ ] thrush [ ] pharyngeal exudate  Cardiovascular:   [ ] murmur [ ] normal [ ] PPM/AICD  Respiratory:  [ ] clear to ausculation bilaterally  GI:  [ ] soft, non-tender, normal bowel sounds  :  [ ] mabry [ ] no CVA tenderness   Musculoskeletal:  [ ] no synovitis  Neurologic:  [ ] non-focal exam   Skin:  [ ] no rash  Lymph: [ ] no lymphadenopathy  Psychiatric:  [ ] appropriate affect [ ] alert & oriented  Lines:  [ ] no phlebitis [ ] central line                                14.4   3.31  )-----------( 247      ( 28 Oct 2017 05:00 )             44.5       10-28    134<L>  |  98  |  12  ----------------------------<  121<H>  4.1   |  21<L>  |  1.03    Ca    8.5      28 Oct 2017 05:00    TPro  7.6  /  Alb  4.2  /  TBili  0.3  /  DBili  x   /  AST  40  /  ALT  61<H>  /  AlkPhos  70  10-28      Urinalysis Basic - ( 28 Oct 2017 08:20 )    Color: YELLOW / Appearance: CLEAR / S.020 / pH: 6.5  Gluc: NEGATIVE / Ketone: NEGATIVE  / Bili: NEGATIVE / Urobili: NORMAL E.U.   Blood: NEGATIVE / Protein: 20 / Nitrite: NEGATIVE   Leuk Esterase: NEGATIVE / RBC: 0-2 / WBC 0-2   Sq Epi: OCC / Non Sq Epi: x / Bacteria: x        MICROBIOLOGY:  BLOOD PERIPHERAL  10-08-17 -- negative for growth       BLOOD VENOUS  10-08-17 --  negative               v            RADIOLOGY: HPI:  51 yo M with hx of HTN and psoriatic arthritis came in c/o having  fevers at home. Patient was recently in the hospital from 10/8 - 10/13 admitted with sepsis 2/2 R. hand wrist/cellulitis in setting of recent steroid injection to R. wrist. MRI at that time had shown dorsal hand and wrist cellulitic changes, along with tenosynvoitis and joint capsule synovitis. Patient was treated with IV clindamycin and then changed to IV vancomycin on d/c with last day on 10/28. Patient had been in [facility] the week of discharge, but over this past week, patient says he had constant fevers of 102-104, which had improved with Tylenol at the time, but still reoccurred after a few hours after Tylenol given. Patient reports he has associated chills, joint pains at the time of fevers, and worsening R. hand wrist swelling since discharge (had improved during hospitalization). Patient also reports wrist pain with movement. All these symptoms have occurred over the past week. Patient was hoping fevers would go away, but nurse/CM told him if he should be evaluated in ER for persistent fevers. No complaints of any cough, sob, abdominal pain, diarrhea, constipation, dysuria, headaches or rash. Patient had psoriatic patches, but no change in patches.            PAST MEDICAL & SURGICAL HISTORY:  Psoriatic arthritis  Essential hypertension  No significant past surgical history      Allergies  No Known Allergies        ANTIMICROBIALS:  piperacillin/tazobactam IVPB. 3.375 every 8 hours  vancomycin  IVPB 1000 every 12 hours      OTHER MEDS:  acetaminophen   Tablet 650 milliGRAM(s) Oral every 6 hours PRN  enalapril 10 milliGRAM(s) Oral daily  fluocinonide 0.05% Ointment 1 Application(s) Topical daily PRN  folic acid 1 milliGRAM(s) Oral daily  nicotine - 21 mG/24Hr(s) Patch 1 patch Transdermal daily  oxyCODONE    IR 5 milliGRAM(s) Oral every 6 hours PRN  sodium chloride 0.9%. 1000 milliLiter(s) IV Continuous <Continuous>      SOCIAL HISTORY: non smoker and non drinker     FAMILY HISTORY:  Family history of hypertension (Mother)      REVIEW OF SYSTEMS  [  ] ROS unobtainable because:    [  x] All other systems negative except as noted below:	    Constitutional:  [x ] fever [ ] weight loss  Skin:  [ ] rash [ ] phlebitis	  Eyes: [ ] icterus [ ] inflammation	  ENMT: [ ] discharge [ ] thrush [ ] ulcers [ ] exudates  Respiratory: [ ] dyspnea [ ] hemoptysis [ ] cough [ ] sputum	  Cardiovascular:  [ ] chest pain [ ] palpitations [ ] edema	  Gastrointestinal:  [ ] nausea [ ] vomiting [ ] diarrhea [ ] constipation [ ] pain	  Genitourinary:  [ ] dysuria [ ] frequency [ ] hematuria [ ] discharge [ ] flank pain  Musculoskeletal:  [ ] myalgias [ ] arthralgias [x ] arthritis	  Neurological:  [ ] headache [ ] seizures	  Psychiatric:  [ ] anxiety [ ] depression	  Hematology/Lymphatics:  [ ] lymphadenopathy  Endocrine:  [ ] adrenal [ ] thyroid  Allergic/Immunologic:	 [ ] transplant [ ] seasonal    Vital Signs Last 24 Hrs  T(F): 98.7 (10-28-17 @ 10:17), Max: 102.2 (10-28-17 @ 05:14)    Vital Signs Last 24 Hrs  HR: 85 (10-28-17 @ 10:17) (80 - 108)  BP: 106/74 (10-28-17 @ 10:17) (103/70 - 122/70)  RR: 17 (10-28-17 @ 10:17)  SpO2: 99% (10-28-17 @ 10:17) (99% - 100%)  Wt(kg): --    PHYSICAL EXAM:     General: non-toxic  HEAD/EYES: anicteric, PERRL  ENT:  supple  Cardiovascular:   S1, S2  Respiratory:  clear bilaterally  GI:  soft, non-tender, normal bowel sounds  :  no CVA tenderness   Musculoskeletal:  swelling at the wrist joint   Neurologic:  grossly non-focal  Skin:  no rash  Lymph: no lymphadenopathy  Psychiatric:  appropriate affect  Vascular:  picc line, with no erythema and tenderness                                 14.4   3.31  )-----------( 247      ( 28 Oct 2017 05:00 )             44.5       10-28    134<L>  |  98  |  12  ----------------------------<  121<H>  4.1   |  21<L>  |  1.03    Ca    8.5      28 Oct 2017 05:00    TPro  7.6  /  Alb  4.2  /  TBili  0.3  /  DBili  x   /  AST  40  /  ALT  61<H>  /  AlkPhos  70  10-28      Urinalysis Basic - ( 28 Oct 2017 08:20 )    Color: YELLOW / Appearance: CLEAR / S.020 / pH: 6.5  Gluc: NEGATIVE / Ketone: NEGATIVE  / Bili: NEGATIVE / Urobili: NORMAL E.U.   Blood: NEGATIVE / Protein: 20 / Nitrite: NEGATIVE   Leuk Esterase: NEGATIVE / RBC: 0-2 / WBC 0-2   Sq Epi: OCC / Non Sq Epi: x / Bacteria: x        MICROBIOLOGY:  BLOOD PERIPHERAL  10-08-17 -- negative for growth       BLOOD VENOUS  10-08-17 --  negative     blood cultures sent and rvp is negative                   RADIOLOGY:m xray wirst- improvement as compared to the last   chest xray- left picc line, no lung infeciton HPI:  53 yo M with hx of HTN and psoriatic arthritis came in c/o having  fevers at home. Patient was recently in the hospital from 10/8 - 10/13 admitted with sepsis 2/2 R. hand wrist/cellulitis in setting of recent steroid injection to R. wrist. MRI at that time had shown dorsal hand and wrist cellulitic changes, along with tenosynvoitis and joint capsule synovitis. Patient was treated with IV clindamycin and then changed to IV vancomycin on d/c with last day on 10/28. Patient had been in [facility] the week of discharge, but over this past week, patient says he had constant fevers of 102-104, which had improved with Tylenol at the time, but still reoccurred after a few hours after Tylenol given. Patient reports he has associated chills, joint pains at the time of fevers, and worsening R. hand wrist swelling since discharge (had improved during hospitalization). Patient also reports wrist pain with movement. All these symptoms have occurred over the past week. Patient was hoping fevers would go away, but nurse/CM told him if he should be evaluated in ER for persistent fevers. No complaints of any cough, sob, abdominal pain, diarrhea, constipation, dysuria, headaches or rash. Patient had psoriatic patches, but no change in patches.            PAST MEDICAL & SURGICAL HISTORY:  Psoriatic arthritis  Essential hypertension  No significant past surgical history      Allergies  No Known Allergies        ANTIMICROBIALS:  piperacillin/tazobactam IVPB. 3.375 every 8 hours  vancomycin  IVPB 1000 every 12 hours      OTHER MEDS:  acetaminophen   Tablet 650 milliGRAM(s) Oral every 6 hours PRN  enalapril 10 milliGRAM(s) Oral daily  fluocinonide 0.05% Ointment 1 Application(s) Topical daily PRN  folic acid 1 milliGRAM(s) Oral daily  nicotine - 21 mG/24Hr(s) Patch 1 patch Transdermal daily  oxyCODONE    IR 5 milliGRAM(s) Oral every 6 hours PRN  sodium chloride 0.9%. 1000 milliLiter(s) IV Continuous <Continuous>      SOCIAL HISTORY: non smoker and non drinker     FAMILY HISTORY:  Family history of hypertension (Mother)      REVIEW OF SYSTEMS  [  ] ROS unobtainable because:    [  x] All other systems negative except as noted below:	    Constitutional:  [x ] fever [ ] weight loss  Skin:  [ ] rash [ ] phlebitis	  Eyes: [ ] icterus [ ] inflammation	  ENMT: [ ] discharge [ ] thrush [ ] ulcers [ ] exudates  Respiratory: [ ] dyspnea [ ] hemoptysis [ ] cough [ ] sputum	  Cardiovascular:  [ ] chest pain [ ] palpitations [ ] edema	  Gastrointestinal:  [ ] nausea [ ] vomiting [ ] diarrhea [ ] constipation [ ] pain	  Genitourinary:  [ ] dysuria [ ] frequency [ ] hematuria [ ] discharge [ ] flank pain  Musculoskeletal:  [ ] myalgias [ ] arthralgias [x ] arthritis	  Neurological:  [ ] headache [ ] seizures	  Psychiatric:  [ ] anxiety [ ] depression	  Hematology/Lymphatics:  [ ] lymphadenopathy  Endocrine:  [ ] adrenal [ ] thyroid  Allergic/Immunologic:	 [ ] transplant [ ] seasonal    Vital Signs Last 24 Hrs  T(F): 98.7 (10-28-17 @ 10:17), Max: 102.2 (10-28-17 @ 05:14)    Vital Signs Last 24 Hrs  HR: 85 (10-28-17 @ 10:17) (80 - 108)  BP: 106/74 (10-28-17 @ 10:17) (103/70 - 122/70)  RR: 17 (10-28-17 @ 10:17)  SpO2: 99% (10-28-17 @ 10:17) (99% - 100%)  Wt(kg): --    PHYSICAL EXAM:     General: non-toxic  HEAD/EYES: anicteric, PERRL  ENT:  supple  Cardiovascular:   S1, S2  Respiratory:  clear bilaterally  GI:  soft, non-tender, normal bowel sounds  :  no CVA tenderness   Musculoskeletal:  swelling at the wrist joint   Neurologic:  grossly non-focal  Skin:  no rash scaly skin fingers and scattered patches   Lymph: no lymphadenopathy  Psychiatric:  appropriate affect  Vascular:  picc line, with no erythema and tenderness                                 14.4   3.31  )-----------( 247      ( 28 Oct 2017 05:00 )             44.5       10-28    134<L>  |  98  |  12  ----------------------------<  121<H>  4.1   |  21<L>  |  1.03    Ca    8.5      28 Oct 2017 05:00    TPro  7.6  /  Alb  4.2  /  TBili  0.3  /  DBili  x   /  AST  40  /  ALT  61<H>  /  AlkPhos  70  10-      Urinalysis Basic - ( 28 Oct 2017 08:20 )    Color: YELLOW / Appearance: CLEAR / S.020 / pH: 6.5  Gluc: NEGATIVE / Ketone: NEGATIVE  / Bili: NEGATIVE / Urobili: NORMAL E.U.   Blood: NEGATIVE / Protein: 20 / Nitrite: NEGATIVE   Leuk Esterase: NEGATIVE / RBC: 0-2 / WBC 0-2   Sq Epi: OCC / Non Sq Epi: x / Bacteria: x        MICROBIOLOGY:  BLOOD PERIPHERAL  10-08-17 -- negative for growth       BLOOD VENOUS  10-08-17 --  negative     blood cultures sent and rvp is negative                   RADIOLOGY:m xray wirst- improvement as compared to the last   chest xray- left picc line, no lung infeciton

## 2017-10-28 NOTE — H&P ADULT - HISTORY OF PRESENT ILLNESS
51 yo M with hx of HTN and psoriatic arthritis presents for fevers at home. Patient was recently in the hospital from 10/8 - 10/13 admitted with sepsis 2/2 R. hand wrist/cellulitis in setting of recent steroid injection to R. wrist. MRI at that time had shown dorsal hand and wrist cellulitic changes, along with tenosynvoitis and joint capsule synovitis. Patient was treated with IV clindamycin and then changed to IV vancomycin on d/c with last day on 10/28. Patient had been in [facility] the week of discharge, but over this past week, patient says he had constant fevers of 102-104, which had improved with Tylenol at the time, but still reoccurred after a few hours after Tylenol given. Patient reports he has associated chills, joint pains at the time of fevers, and worsening R. hand wrist swelling since discharge (had improved during hospitalization). Patient also reports wrist pain with movement. All these symptoms have occurred over the past week. Patient was hoping fevers would go away, but nurse/CM told him if he should be evaluated in ER for persistent fevers. No other cough, abdominal pain, diarrhea, constipation, dysuria, headaches or rash. Patient had psoriatic patches, but no change in patch frequency.     In the ER: patient's BP: 116/79, HR of 108, Tmax of 102.2F rectal, RR of 16 with O2 sat of 100%. Patient had been given vancomycin x1. 53 yo M with hx of HTN and psoriatic arthritis came in c/o having  fevers at home. Patient was recently in the hospital from 10/8 - 10/13 admitted with sepsis 2/2 R. hand wrist/cellulitis in setting of recent steroid injection to R. wrist. MRI at that time had shown dorsal hand and wrist cellulitic changes, along with tenosynvoitis and joint capsule synovitis. Patient was treated with IV clindamycin and then changed to IV vancomycin on d/c with last day on 10/28. Patient had been in [facility] the week of discharge, but over this past week, patient says he had constant fevers of 102-104, which had improved with Tylenol at the time, but still reoccurred after a few hours after Tylenol given. Patient reports he has associated chills, joint pains at the time of fevers, and worsening R. hand wrist swelling since discharge (had improved during hospitalization). Patient also reports wrist pain with movement. All these symptoms have occurred over the past week. Patient was hoping fevers would go away, but nurse/CM told him if he should be evaluated in ER for persistent fevers. No complaints of any cough, sob, abdominal pain, diarrhea, constipation, dysuria, headaches or rash. Patient had psoriatic patches, but no change in patches.     In the ER: patient's BP: 116/79, HR of 108, Tmax of 102.2F rectal, RR of 16 with O2 sat of 100%. Patient had been given vancomycin x1.

## 2017-10-29 ENCOUNTER — TRANSCRIPTION ENCOUNTER (OUTPATIENT)
Age: 52
End: 2017-10-29

## 2017-10-29 LAB
BACTERIA UR CULT: SIGNIFICANT CHANGE UP
BASOPHILS # BLD AUTO: 0.02 K/UL — SIGNIFICANT CHANGE UP (ref 0–0.2)
BASOPHILS NFR BLD AUTO: 0.6 % — SIGNIFICANT CHANGE UP (ref 0–2)
BUN SERPL-MCNC: 7 MG/DL — SIGNIFICANT CHANGE UP (ref 7–23)
CALCIUM SERPL-MCNC: 8.2 MG/DL — LOW (ref 8.4–10.5)
CHLORIDE SERPL-SCNC: 100 MMOL/L — SIGNIFICANT CHANGE UP (ref 98–107)
CO2 SERPL-SCNC: 21 MMOL/L — LOW (ref 22–31)
CREAT SERPL-MCNC: 0.82 MG/DL — SIGNIFICANT CHANGE UP (ref 0.5–1.3)
EOSINOPHIL # BLD AUTO: 0.21 K/UL — SIGNIFICANT CHANGE UP (ref 0–0.5)
EOSINOPHIL NFR BLD AUTO: 6 % — SIGNIFICANT CHANGE UP (ref 0–6)
GLUCOSE SERPL-MCNC: 112 MG/DL — HIGH (ref 70–99)
HCT VFR BLD CALC: 41.5 % — SIGNIFICANT CHANGE UP (ref 39–50)
HGB BLD-MCNC: 13.4 G/DL — SIGNIFICANT CHANGE UP (ref 13–17)
HIV 1+2 AB+HIV1 P24 AG SERPL QL IA: SIGNIFICANT CHANGE UP
IMM GRANULOCYTES # BLD AUTO: 0.01 # — SIGNIFICANT CHANGE UP
IMM GRANULOCYTES NFR BLD AUTO: 0.3 % — SIGNIFICANT CHANGE UP (ref 0–1.5)
LYMPHOCYTES # BLD AUTO: 0.99 K/UL — LOW (ref 1–3.3)
LYMPHOCYTES # BLD AUTO: 28.4 % — SIGNIFICANT CHANGE UP (ref 13–44)
MAGNESIUM SERPL-MCNC: 2 MG/DL — SIGNIFICANT CHANGE UP (ref 1.6–2.6)
MCHC RBC-ENTMCNC: 23.8 PG — LOW (ref 27–34)
MCHC RBC-ENTMCNC: 32.3 % — SIGNIFICANT CHANGE UP (ref 32–36)
MCV RBC AUTO: 73.6 FL — LOW (ref 80–100)
MONOCYTES # BLD AUTO: 0.57 K/UL — SIGNIFICANT CHANGE UP (ref 0–0.9)
MONOCYTES NFR BLD AUTO: 16.4 % — HIGH (ref 2–14)
NEUTROPHILS # BLD AUTO: 1.68 K/UL — LOW (ref 1.8–7.4)
NEUTROPHILS NFR BLD AUTO: 48.3 % — SIGNIFICANT CHANGE UP (ref 43–77)
NRBC # FLD: 0 — SIGNIFICANT CHANGE UP
PHOSPHATE SERPL-MCNC: 2.9 MG/DL — SIGNIFICANT CHANGE UP (ref 2.5–4.5)
PLATELET # BLD AUTO: 246 K/UL — SIGNIFICANT CHANGE UP (ref 150–400)
PMV BLD: 10 FL — SIGNIFICANT CHANGE UP (ref 7–13)
POTASSIUM SERPL-MCNC: 4.1 MMOL/L — SIGNIFICANT CHANGE UP (ref 3.5–5.3)
POTASSIUM SERPL-SCNC: 4.1 MMOL/L — SIGNIFICANT CHANGE UP (ref 3.5–5.3)
RBC # BLD: 5.64 M/UL — SIGNIFICANT CHANGE UP (ref 4.2–5.8)
RBC # FLD: 14.7 % — HIGH (ref 10.3–14.5)
SODIUM SERPL-SCNC: 135 MMOL/L — SIGNIFICANT CHANGE UP (ref 135–145)
SPECIMEN SOURCE: SIGNIFICANT CHANGE UP
WBC # BLD: 3.48 K/UL — LOW (ref 3.8–10.5)
WBC # FLD AUTO: 3.48 K/UL — LOW (ref 3.8–10.5)

## 2017-10-29 PROCEDURE — 99232 SBSQ HOSP IP/OBS MODERATE 35: CPT

## 2017-10-29 PROCEDURE — 99233 SBSQ HOSP IP/OBS HIGH 50: CPT | Mod: GC

## 2017-10-29 RX ORDER — LANOLIN ALCOHOL/MO/W.PET/CERES
3 CREAM (GRAM) TOPICAL AT BEDTIME
Qty: 0 | Refills: 0 | Status: DISCONTINUED | OUTPATIENT
Start: 2017-10-29 | End: 2017-10-30

## 2017-10-29 RX ADMIN — Medication 10 MILLIGRAM(S): at 06:30

## 2017-10-29 RX ADMIN — Medication 1 MILLIGRAM(S): at 13:11

## 2017-10-29 RX ADMIN — Medication 1 PATCH: at 13:13

## 2017-10-29 RX ADMIN — Medication 1 PATCH: at 13:12

## 2017-10-29 RX ADMIN — Medication 3 MILLIGRAM(S): at 22:26

## 2017-10-29 NOTE — PROGRESS NOTE ADULT - PROBLEM SELECTOR PLAN 1
Patient with fevers, WBC with 10% bandemia, with worsening R. wrist pain and swelling concerning for infected joint although patient had been on abx  -At last admission, also concern for gout, psoriatic flare, but infectious joint had been more likely. Patient had been doing well on clindamycin inpatient and vancomycin. ID consult for choice of abx in setting of breakthrough fevers. Will also start zosyn for GN coverage and continue vancomycin.   -UA, CXR negative. Blood cx sent. May be a role for joint tap. - Patient p/w fevers, WBC with 10% bandemia, and worsening R. wrist pain and swelling concerning for infected joint although patient had been on abx  - At last admission, also concern for gout, psoriatic flare, but infectious joint had been more likely. Patient had been doing well on Clindamycin inpatient prior to switching to Vancomycin before discharge.  - ID consulted who suspect that pt's symptoms are 2/2 drug reaction due to IV Vancomycin. Currently monitoring patient off antibiotics. He remained afebrile overnight.  - Rheumatology consulted for further evaluation, will f/u recs.  - UA, CXR negative. Blood/Urine Cx (10/28): NGTD.   - Patient p/w leukopenia which may be 2/2 IV Vancomycin as well. WBC stable this AM.  - Will monitor CBC and fever curve.

## 2017-10-29 NOTE — CONSULT NOTE ADULT - SUBJECTIVE AND OBJECTIVE BOX
[facility]  [id number]    HISTORY OF PRESENT ILLNESS:  Pt is a 52yoM hx of psoriatic arthritis (currently not on therapy) hx of recent admission from 10/8-10/13 for possible septic arthritis presenting with fevers. Rheum consulted for eval of R wrist pain.    Pt sees [facility] rheumatology- was getting steroid injections in R wrist. On 10/7, he came in with concern for septic wrist, d/c on IV Vancomycin. He had improvement in his R wrist pain while on antibiotics. However, about 1 week ago, he developed fevers which became progressively worse so he came to ED. States that the R wrist was also getting progressively more painful but has improved since admission.    Pt seen by ID- believe that fevers may be 2/2 vancomycin therapy. Abx have been stopped and pt remains afebrile. States that he currently has no pain in his wrist or any other joints. Denies N/V/C/D, fevers/chills.     PAST MEDICAL & SURGICAL HISTORY:  Psoriatic arthritis  Essential hypertension  No significant past surgical history      Review of Systems:  Gen:  No fevers/chills, weight loss  HEENT: No blurry vision, no difficulty swallowing  CVS: No chest pain/palpitations  Resp: No SOB/wheezing  GI: No N/V/C/D/abdominal pain  MSK: No joint pains  Skin: +psoriasis plaques  Neuro: No headaches    MEDICATIONS  (STANDING):  enalapril 10 milliGRAM(s) Oral daily  folic acid 1 milliGRAM(s) Oral daily  nicotine - 21 mG/24Hr(s) Patch 1 patch Transdermal daily  sodium chloride 0.9%. 1000 milliLiter(s) (75 mL/Hr) IV Continuous <Continuous>    MEDICATIONS  (PRN):  acetaminophen   Tablet 650 milliGRAM(s) Oral every 6 hours PRN For Temp greater than 38 C (100.4 F)  fluocinonide 0.05% Ointment 1 Application(s) Topical daily PRN For flare-ups  oxyCODONE    IR 5 milliGRAM(s) Oral every 6 hours PRN Severe Pain (7 - 10)      Allergies    No Known Allergies    Intolerances      FAMILY HISTORY:  Family history of hypertension (Mother)      Vital Signs Last 24 Hrs  T(C): 36.6 (29 Oct 2017 06:26), Max: 38.3 (28 Oct 2017 15:36)  T(F): 97.9 (29 Oct 2017 06:26), Max: 100.9 (28 Oct 2017 15:36)  HR: 87 (29 Oct 2017 06:26) (87 - 98)  BP: 119/84 (29 Oct 2017 06:26) (107/75 - 120/79)  BP(mean): --  RR: 17 (29 Oct 2017 06:26) (17 - 18)  SpO2: 100% (29 Oct 2017 06:26) (99% - 100%)    Daily Height in cm: 165.1 (28 Oct 2017 16:13)    Daily     Physical Exam:  General: No apparent distress  HEENT: EOMI, MMM  CVS: +S1/S2, RRR, no murmurs/rubs/gallops  Resp: CTA b/l. No crackles/wheezing  GI: Soft, NT/ND +BS  MSK:  Shoulders: wnl  Elbows: wnl  Wrists: R wrist with some swelling but not tender. Limited flexion/extension when compared to L wrist. b/l wrists not TTP.  MCPs: wnl  PIPs: wnl  DIPs: wnl   Hips: wnl  Knees: wnl   Ankle: wnl  Neuro: AAOx3  Skin: large psoriatic plaque on L knee and smaller plaques noted on hands    LABS:                        13.4   3.48  )-----------( 246      ( 29 Oct 2017 05:53 )             41.5     10-29    135  |  100  |  7   ----------------------------<  112<H>  4.1   |  21<L>  |  0.82    Ca    8.2<L>      29 Oct 2017 05:53  Phos  2.9     10-29  Mg     2.0     10-29    TPro  7.6  /  Alb  4.2  /  TBili  0.3  /  DBili  x   /  AST  40  /  ALT  61<H>  /  AlkPhos  70  10-28      Urinalysis Basic - ( 28 Oct 2017 08:20 )    Color: YELLOW / Appearance: CLEAR / S.020 / pH: 6.5  Gluc: NEGATIVE / Ketone: NEGATIVE  / Bili: NEGATIVE / Urobili: NORMAL E.U.   Blood: NEGATIVE / Protein: 20 / Nitrite: NEGATIVE   Leuk Esterase: NEGATIVE / RBC: 0-2 / WBC 0-2   Sq Epi: OCC / Non Sq Epi: x / Bacteria: x        RADIOLOGY & ADDITIONAL STUDIES:

## 2017-10-29 NOTE — DISCHARGE NOTE ADULT - CARE PLAN
Principal Discharge DX:	Psoriatic arthritis  Goal:	Follow up with rheumatology after discharge.  Instructions for follow-up, activity and diet:	You were admitted to the hospital with fevers and concern of infection of your right wrist. It was determined that the likely cause of your fever was related to your vancomycin medication which was discontinued. During this hospital admission your fever resolved after the medication was stopped. Rheumatology (Dr. Estrada) would like to follow up with you after discharge to discuss continuing your methotrexate for better long term control of your psoriatic arthritis. Principal Discharge DX:	Psoriatic arthritis  Goal:	Follow up with rheumatology after discharge.  Instructions for follow-up, activity and diet:	You were admitted to the hospital with fevers and concern of infection of your right wrist. It was determined that the likely cause of your fever was related to your vancomycin medication which was discontinued. During this hospital admission your fever resolved after the medication was stopped. Rheumatology (Dr. Estrada) would like to follow up with you after discharge to discuss continuing your methotrexate for better long term control of your psoriatic arthritis.  Secondary Diagnosis:	Essential hypertension  Instructions for follow-up, activity and diet:	Your blood pressure has been well controlled this hospital stay. Please continue taking your home medications.  Secondary Diagnosis:	Wrist pain, right  Instructions for follow-up, activity and diet:	Please take Oxycodone as needed for pain.

## 2017-10-29 NOTE — PROGRESS NOTE ADULT - PROBLEM SELECTOR PLAN 5
Improve score of 0. Patient ambulatory at baseline, encourage ambulation. - Holding pharmacologic AC as Improve score is 0. Patient is ambulatory at baseline. Will encourage ambulation.

## 2017-10-29 NOTE — DISCHARGE NOTE ADULT - MEDICATION SUMMARY - MEDICATIONS TO TAKE
I will START or STAY ON the medications listed below when I get home from the hospital:    oxyCODONE 5 mg oral tablet  -- 1 tab(s) by mouth every 6 hours, As needed, Severe Pain (7 - 10)  -- Indication: For Wrist pain, right    enalapril 10 mg oral tablet  -- 1 tab(s) by mouth once a day  -- Indication: For Essential hypertension    halobetasol 0.05% topical ointment  -- Apply on skin to affected area once a day, As Needed  -- Indication: For Cellulitis    nicotine 21 mg/24 hr transdermal film, extended release  -- 1  by transdermal patch once a day  continue as directed   -- Indication: For Smoking cessation    folic acid 1 mg oral tablet  -- 1 tab(s) by mouth once a day  -- Indication: For Supplementation

## 2017-10-29 NOTE — DISCHARGE NOTE ADULT - PATIENT PORTAL LINK FT
“You can access the Follow[facility] Patient Portal, offered by [facility], by registering with the following website: [email or web address]”

## 2017-10-29 NOTE — DISCHARGE NOTE ADULT - PLAN OF CARE
Follow up with rheumatology after discharge. You were admitted to the hospital with fevers and concern of infection of your right wrist. It was determined that the likely cause of your fever was related to your vancomycin medication which was discontinued. During this hospital admission your fever resolved after the medication was stopped. Rheumatology (Dr. Estrada) would like to follow up with you after discharge to discuss continuing your methotrexate for better long term control of your psoriatic arthritis. Your blood pressure has been well controlled this hospital stay. Please continue taking your home medications. Please take Oxycodone as needed for pain.

## 2017-10-29 NOTE — DISCHARGE NOTE ADULT - CARE PROVIDER_API CALL
Yair Skinner), Internal Medicine; Rheumatology  [address] [place] [address]  Phone: [phone]  Fax: [phone]

## 2017-10-29 NOTE — PROGRESS NOTE ADULT - SUBJECTIVE AND OBJECTIVE BOX
Patient is a 52y old  Male who presents with a chief complaint of Fevers (28 Oct 2017 10:46)      SUBJECTIVE / OVERNIGHT EVENTS:    MEDICATIONS  (STANDING):  enalapril 10 milliGRAM(s) Oral daily  folic acid 1 milliGRAM(s) Oral daily  nicotine - 21 mG/24Hr(s) Patch 1 patch Transdermal daily  sodium chloride 0.9%. 1000 milliLiter(s) (75 mL/Hr) IV Continuous <Continuous>    MEDICATIONS  (PRN):  acetaminophen   Tablet 650 milliGRAM(s) Oral every 6 hours PRN For Temp greater than 38 C (100.4 F)  fluocinonide 0.05% Ointment 1 Application(s) Topical daily PRN For flare-ups  oxyCODONE    IR 5 milliGRAM(s) Oral every 6 hours PRN Severe Pain (7 - 10)        CAPILLARY BLOOD GLUCOSE        I&O's Summary      PHYSICAL EXAM:  GENERAL: NAD, well-developed  HEAD:  Atraumatic, Normocephalic  EYES: EOMI, PERRLA, conjunctiva and sclera clear  NECK: Supple, No JVD  CHEST/LUNG: Clear to auscultation bilaterally; No wheeze  HEART: Regular rate and rhythm; No murmurs, rubs, or gallops  ABDOMEN: Soft, Nontender, Nondistended; Bowel sounds present  EXTREMITIES:  2+ Peripheral Pulses, No clubbing, cyanosis, or edema  PSYCH: AAOx3  NEUROLOGY: non-focal  SKIN: No rashes or lesions    LABS:                        13.4   3.48  )-----------( 246      ( 29 Oct 2017 05:53 )             41.5     10-29    135  |  100  |  7   ----------------------------<  112<H>  4.1   |  21<L>  |  0.82    Ca    8.2<L>      29 Oct 2017 05:53  Phos  2.9     10-29  Mg     2.0     10-29    TPro  7.6  /  Alb  4.2  /  TBili  0.3  /  DBili  x   /  AST  40  /  ALT  61<H>  /  AlkPhos  70  10-28          Urinalysis Basic - ( 28 Oct 2017 08:20 )    Color: YELLOW / Appearance: CLEAR / S.020 / pH: 6.5  Gluc: NEGATIVE / Ketone: NEGATIVE  / Bili: NEGATIVE / Urobili: NORMAL E.U.   Blood: NEGATIVE / Protein: 20 / Nitrite: NEGATIVE   Leuk Esterase: NEGATIVE / RBC: 0-2 / WBC 0-2   Sq Epi: OCC / Non Sq Epi: x / Bacteria: x        RADIOLOGY & ADDITIONAL TESTS:    Imaging Personally Reviewed:    Consultant(s) Notes Reviewed:      Care Discussed with Consultants/Other Providers: Patient is a 52y old  Male who presents with a chief complaint of Fevers (28 Oct 2017 10:46)    SUBJECTIVE / OVERNIGHT EVENTS:  No acute events overnight. Patient reports that he feels better this AM. He states that his right wrist pain feels improved today. Patient was afebrile overnight. He denies any chest pain, shortness of breath, nausea, vomiting, or abdominal pain.    MEDICATIONS  (STANDING):  enalapril 10 milliGRAM(s) Oral daily  folic acid 1 milliGRAM(s) Oral daily  nicotine - 21 mG/24Hr(s) Patch 1 patch Transdermal daily  sodium chloride 0.9%. 1000 milliLiter(s) (75 mL/Hr) IV Continuous <Continuous>    MEDICATIONS  (PRN):  acetaminophen   Tablet 650 milliGRAM(s) Oral every 6 hours PRN For Temp greater than 38 C (100.4 F)  fluocinonide 0.05% Ointment 1 Application(s) Topical daily PRN For flare-ups  oxyCODONE    IR 5 milliGRAM(s) Oral every 6 hours PRN Severe Pain (7 - 10)        CAPILLARY BLOOD GLUCOSE    I&O's Summary    PHYSICAL EXAM:  GENERAL: NAD  HEAD: NC/AT  EYES: EOMI, PERRLA, conjunctiva and sclera clear  NECK: Supple  CHEST/LUNG: CTAB; No wheeze appreciated  HEART: RRR; +S1/S2  ABDOMEN: +BS, Soft, Nontender, Nondistended, No rigidity  MUSCULOSKELETAL: Mild edema and minimal erythema present over dorsal aspect of R wrist, No significant TTP appreciated, There is mild pain with flexion and extension of wrist which causes minimally reduced ROM. No significant swelling appreciated in other joints.  PSYCH: Normal mood and affect  NEUROLOGY: A+Ox3, Nonfocal  SKIN: Psoriatic patches present on extremities    LABS:                        13.4   3.48  )-----------( 246      ( 29 Oct 2017 05:53 )             41.5     10-29    135  |  100  |  7   ----------------------------<  112<H>  4.1   |  21<L>  |  0.82    Ca    8.2<L>      29 Oct 2017 05:53  Phos  2.9     10-  Mg     2.0     10-29    TPro  7.6  /  Alb  4.2  /  TBili  0.3  /  DBili  x   /  AST  40  /  ALT  61<H>  /  AlkPhos  70  10-28      Urinalysis Basic - ( 28 Oct 2017 08:20 )    Color: YELLOW / Appearance: CLEAR / S.020 / pH: 6.5  Gluc: NEGATIVE / Ketone: NEGATIVE  / Bili: NEGATIVE / Urobili: NORMAL E.U.   Blood: NEGATIVE / Protein: 20 / Nitrite: NEGATIVE   Leuk Esterase: NEGATIVE / RBC: 0-2 / WBC 0-2   Sq Epi: OCC / Non Sq Epi: x / Bacteria: x    Blood Cx (10/28): NGTD    RADIOLOGY & ADDITIONAL TESTS:    Imaging Personally Reviewed:  Right Wrist X-ray (10/28): No evidence of acute fracture or dislocation. Indistinct calcific foci over the dorsum of the wrist with associated soft tissue swelling, decreased since 10/8/2017.    CXR (10/28): Left upper extremity PICC with tip in the SVC. The lungs are clear.    Consultant(s) Notes Reviewed:    Infectious Disease    Care Discussed with Consultants/Other Providers: Patient is a 52y old  Male who presents with a chief complaint of Fevers (28 Oct 2017 10:46)    SUBJECTIVE / OVERNIGHT EVENTS:  No acute events overnight. Patient reports that he feels better this AM. He states that his right wrist pain feels improved today. Patient was afebrile overnight. He denies any chest pain, shortness of breath, nausea, vomiting, or abdominal pain.    MEDICATIONS  (STANDING):  enalapril 10 milliGRAM(s) Oral daily  folic acid 1 milliGRAM(s) Oral daily  nicotine - 21 mG/24Hr(s) Patch 1 patch Transdermal daily  sodium chloride 0.9%. 1000 milliLiter(s) (75 mL/Hr) IV Continuous <Continuous>    MEDICATIONS  (PRN):  acetaminophen   Tablet 650 milliGRAM(s) Oral every 6 hours PRN For Temp greater than 38 C (100.4 F)  fluocinonide 0.05% Ointment 1 Application(s) Topical daily PRN For flare-ups  oxyCODONE    IR 5 milliGRAM(s) Oral every 6 hours PRN Severe Pain (7 - 10)    Vital Signs Last 24 Hrs  T(C): 36.4 (29 Oct 2017 21:47), Max: 36.6 (29 Oct 2017 06:26)  T(F): 97.6 (29 Oct 2017 21:47), Max: 97.9 (29 Oct 2017 06:26)  HR: 96 (29 Oct 2017 21:47) (87 - 98)  BP: 124/64 (29 Oct 2017 21:47) (119/84 - 125/80)  BP(mean): --  RR: 18 (29 Oct 2017 21:47) (16 - 18)  SpO2: 100% (29 Oct 2017 21:47) (100% - 100%)    CAPILLARY BLOOD GLUCOSE    I&O's Summary    PHYSICAL EXAM:  GENERAL: NAD  HEAD: NC/AT  EYES: EOMI, PERRLA, conjunctiva and sclera clear  NECK: Supple  CHEST/LUNG: CTAB; No wheeze appreciated  HEART: RRR; +S1/S2  ABDOMEN: +BS, Soft, Nontender, Nondistended, No rigidity  MUSCULOSKELETAL: Mild edema and minimal erythema present over dorsal aspect of R wrist, No significant TTP appreciated, There is mild pain with flexion and extension of wrist which causes minimally reduced ROM. No significant swelling appreciated in other joints.  PSYCH: Normal mood and affect  NEUROLOGY: A+Ox3, Nonfocal  SKIN: Psoriatic patches present on extremities    LABS:                        13.4   3.48  )-----------( 246      ( 29 Oct 2017 05:53 )             41.5     10-29    135  |  100  |  7   ----------------------------<  112<H>  4.1   |  21<L>  |  0.82    Ca    8.2<L>      29 Oct 2017 05:53  Phos  2.9     10-29  Mg     2.0     10-29    TPro  7.6  /  Alb  4.2  /  TBili  0.3  /  DBili  x   /  AST  40  /  ALT  61<H>  /  AlkPhos  70  10-28      Urinalysis Basic - ( 28 Oct 2017 08:20 )    Color: YELLOW / Appearance: CLEAR / S.020 / pH: 6.5  Gluc: NEGATIVE / Ketone: NEGATIVE  / Bili: NEGATIVE / Urobili: NORMAL E.U.   Blood: NEGATIVE / Protein: 20 / Nitrite: NEGATIVE   Leuk Esterase: NEGATIVE / RBC: 0-2 / WBC 0-2   Sq Epi: OCC / Non Sq Epi: x / Bacteria: x    Blood Cx (10/28): NGTD    RADIOLOGY & ADDITIONAL TESTS:    Imaging Personally Reviewed:  Right Wrist X-ray (10/28): No evidence of acute fracture or dislocation. Indistinct calcific foci over the dorsum of the wrist with associated soft tissue swelling, decreased since 10/8/2017.    CXR (10/28): Left upper extremity PICC with tip in the SVC. The lungs are clear.    Consultant(s) Notes Reviewed:    Infectious Disease    Care Discussed with Consultants/Other Providers:

## 2017-10-29 NOTE — DISCHARGE NOTE ADULT - ADDITIONAL INSTRUCTIONS
Follow up with your primary care physician to inform them of your recent hospital admission and course.  Follow up with rheumatology (Dr. Estrada, or Dr. Abraham) to discuss the need for medications going forward to control your psoriatic arthritis.

## 2017-10-29 NOTE — PROGRESS NOTE ADULT - PROBLEM SELECTOR PLAN 4
Continue with cream for psoriasis. Methotrexate had been on hold 2/2 infection. - C/w cream for psoriasis. Methotrexate had been on hold 2/2 infection.  - Plan for close follow-up with Rheumatology after discharge.

## 2017-10-29 NOTE — PROGRESS NOTE ADULT - PROBLEM SELECTOR PLAN 2
-Workup as above  -Previous MRI had shown cellulitis, with tenosynovitis and synovitis. Per notes, hand surgery had been consulted for washout but did not recommend it. F/u workup as above, and may need hand surgery depending on status  -Rheum consult - Workup as above  - Previous MRI had shown cellulitis, with tenosynovitis and synovitis. Per notes, hand surgery had been consulted for washout but did not recommend it. F/u workup as above, and may need hand surgery depending on status  - Rheumatology consulted, will f/u recs

## 2017-10-29 NOTE — CONSULT NOTE ADULT - ASSESSMENT
52yoM hx of psoriatic arthritis (currently not on therapy) hx of recent admission from 10/8-10/13 for possible septic arthritis presenting with fevers. Rheum consulted for eval of R wrist pain which has been improving over the last few days.    1) R wrist pain- improved. Pt has had improvement over last few days with no pain on palpation of joint. Currently afebrile  Low suspicion for continued septic joint. Joint swelling likely related to underlying PsA  - Rec close f/u with outpt rheumatologist for continued tx for PsA  - Continue to monitor off antibiotics    Please recall if further questions  d/w Dr Skinner
51 yo M with hx of HTN and psoriatic arthritis came in c/o having  fevers at home. Patient was recently in the hospital from 10/8 - 10/13 admitted with sepsis 2/2 R. hand wrist/cellulitis in setting of recent steroid injection to R. wrist. MRI at that time had shown dorsal hand and wrist cellulitic changes, along with tenosynvoitis and joint capsule synovitis. Patient was treated with IV clindamycin and then changed to IV vancomycin on d/c with last day on 10/28. Patient had been in [facility] the week of discharge, but over this past week, patient says he had constant fevers of 102-104, which had improved with Tylenol at the time, but still reoccurred after a few hours after Tylenol given. Patient reports he has associated chills, joint pains at the time of fevers    we think this constellation of symptoms with neutropenia could be secondary to Vancomycin   d/c vancomycin and zosyn   will watch off antibiotics   will follow up blood cultures   picc line does not seem to the affected   would follow up tomorrow  wrist is better than last time, much improved, but still there is pain though swelling has gone, would get rheum

## 2017-10-29 NOTE — DISCHARGE NOTE ADULT - HOSPITAL COURSE
51 yo M with PMHx of HTN and psoriatic arthritis presenting with high grade fevers at home admitted with persistent fevers on IV vancomycin. Patient was recently admitted to [facility] from 10/8 - 10/13 with sepsis 2/2 R. hand wrist/cellulitis in setting of recent steroid injection to R. wrist. MRI at that time had shown dorsal hand and wrist cellulitic changes, along with tenosynvoitis and joint capsule synovitis. Patient was treated with IV clindamycin and then changed to IV vancomycin upon discharge with end date on 10/28. In the ER: patient's BP: 116/79, HR of 108, Tmax of 102.2F rectal, RR of 16 with O2 sat of 100%. Patient had been given vancomycin x1. ID was consulted and recommended to stop abx as leukopenia & fevers may be drug-related. Blood Cx negative. Antibiotics were held with resolution of fevers. Rheumatology was consulted and had low suspicion for continued septic joint and likely joint swelling likely related to underlying psoriatic arthritis. 51 yo M with PMHx of HTN and psoriatic arthritis presenting with high grade fevers at home admitted with persistent fevers on IV vancomycin. Patient was recently admitted to [facility] from 10/8 - 10/13 with sepsis 2/2 R. hand wrist/cellulitis in setting of recent steroid injection to R. wrist. MRI at that time had shown dorsal hand and wrist cellulitic changes, along with tenosynvoitis and joint capsule synovitis. Patient was treated with IV clindamycin and then changed to IV vancomycin upon discharge with end date on 10/28. In the ER: patient's BP: 116/79, HR of 108, Tmax of 102.2F rectal, RR of 16 with O2 sat of 100%. Patient had been given vancomycin x1. ID was consulted and recommended to stop abx as leukopenia & fevers may be drug-related. Blood Cx negative. Antibiotics were held with resolution of fevers. Rheumatology was consulted and had low suspicion for continued septic joint and likely joint swelling likely related to underlying psoriatic arthritis. Patient was monitored off of abx with resolution of fever, and the patient was instructed to follow up with rheumatology at discharge to discuss the need to restart MTX or other immunosuppressive therapy for his psoriatic arthritis. 51 yo M with PMHx of HTN and psoriatic arthritis presenting with high grade fevers at home admitted with persistent fevers on IV vancomycin. Patient was recently admitted to [facility] from 10/8 - 10/13 with sepsis 2/2 R. hand wrist/cellulitis in setting of recent steroid injection to R. wrist. MRI at that time had shown dorsal hand and wrist cellulitic changes, along with tenosynvoitis and joint capsule synovitis. Patient was treated with IV clindamycin and then changed to IV vancomycin upon discharge with end date on 10/28. In the ER: patient's BP: 116/79, HR of 108, Tmax of 102.2F rectal, RR of 16 with O2 sat of 100%. Patient had been given vancomycin x1. ID was consulted and recommended to stop abx as leukopenia & fevers may be drug-related. Blood Cx negative. Antibiotics were held with resolution of fevers. Rheumatology was consulted and had low suspicion for continued septic joint and likely joint swelling likely related to underlying psoriatic arthritis. Patient was monitored off of abx with resolution of fever, and the patient was instructed to follow up with rheumatology at discharge to discuss the need to restart MTX or other immunosuppressive therapy for his psoriatic arthritis. In preparation for discharge, left PICC line removed without incident, tip intact. Pressure was held for 20 minutes with hemostasis achieved. He is being discharged to home without need for home health aides or rehabilitation at this time. 51 yo M with PMHx of HTN and psoriatic arthritis presenting with high grade fevers at home admitted with persistent fevers on IV vancomycin. Patient was recently admitted to [facility] from 10/8 - 10/13 with sepsis 2/2 R. hand wrist/cellulitis in setting of recent steroid injection to R. wrist. MRI at that time had shown dorsal hand and wrist cellulitic changes, along with tenosynvoitis and joint capsule synovitis. Patient was treated with IV clindamycin and then changed to IV vancomycin upon discharge with end date on 10/28. In the ER: patient's BP: 116/79, HR of 108, Tmax of 102.2F rectal, RR of 16 with O2 sat of 100%. Patient had been given vancomycin x1. ID was consulted and recommended to stop abx as leukopenia & fevers may be drug-related. Blood Cx negative. Antibiotics were held with resolution of fevers. Rheumatology was consulted and had low suspicion for continued septic joint and likely joint swelling likely related to underlying psoriatic arthritis. Patient was monitored off of abx with resolution of fever, and the patient was instructed to follow up with rheumatology at discharge to discuss the need to restart MTX or other immunosuppressive therapy for his psoriatic arthritis. In preparation for discharge, left PICC line removed without incident, tip intact. Pressure was held for 20 minutes with hemostasis achieved. He is being discharged to home without need for home health aides or rehabilitation at this time. Patient is to follow up with Rheumatology regarding re-starting MTX.

## 2017-10-29 NOTE — DISCHARGE NOTE ADULT - CONDITIONS AT DISCHARGE
Patient is discharged , alert and oriented x 4, independent with his careand he will have the Left Upper Arm PICC Line will be removed prior to going Home.

## 2017-10-29 NOTE — PROGRESS NOTE ADULT - PROBLEM SELECTOR PLAN 3
Continue with enalapril with hand parameters - C/w home Enalapril with hand parameters  - Monitor BP

## 2017-10-29 NOTE — DISCHARGE NOTE ADULT - MEDICATION SUMMARY - MEDICATIONS TO STOP TAKING
I will STOP taking the medications listed below when I get home from the hospital:    vancomycin 1 g intravenous injection  -- 1 gram(s) intravenous every 12 hours through 11/2    methotrexate  -- orally once a week on Tuesdays   hold off  until infection resolves and you are cleared by Infectious disease and rheumatology

## 2017-10-30 VITALS
OXYGEN SATURATION: 100 % | RESPIRATION RATE: 18 BRPM | SYSTOLIC BLOOD PRESSURE: 104 MMHG | DIASTOLIC BLOOD PRESSURE: 73 MMHG | HEART RATE: 83 BPM | TEMPERATURE: 98 F

## 2017-10-30 LAB
BUN SERPL-MCNC: 9 MG/DL — SIGNIFICANT CHANGE UP (ref 7–23)
CALCIUM SERPL-MCNC: 8.6 MG/DL — SIGNIFICANT CHANGE UP (ref 8.4–10.5)
CHLORIDE SERPL-SCNC: 99 MMOL/L — SIGNIFICANT CHANGE UP (ref 98–107)
CO2 SERPL-SCNC: 20 MMOL/L — LOW (ref 22–31)
CREAT SERPL-MCNC: 0.8 MG/DL — SIGNIFICANT CHANGE UP (ref 0.5–1.3)
GLUCOSE SERPL-MCNC: 109 MG/DL — HIGH (ref 70–99)
HCT VFR BLD CALC: 42.7 % — SIGNIFICANT CHANGE UP (ref 39–50)
HGB BLD-MCNC: 14 G/DL — SIGNIFICANT CHANGE UP (ref 13–17)
MAGNESIUM SERPL-MCNC: 2.2 MG/DL — SIGNIFICANT CHANGE UP (ref 1.6–2.6)
MCHC RBC-ENTMCNC: 24 PG — LOW (ref 27–34)
MCHC RBC-ENTMCNC: 32.8 % — SIGNIFICANT CHANGE UP (ref 32–36)
MCV RBC AUTO: 73.2 FL — LOW (ref 80–100)
NRBC # FLD: 0 — SIGNIFICANT CHANGE UP
PHOSPHATE SERPL-MCNC: 3.6 MG/DL — SIGNIFICANT CHANGE UP (ref 2.5–4.5)
PLATELET # BLD AUTO: 262 K/UL — SIGNIFICANT CHANGE UP (ref 150–400)
PMV BLD: 9.5 FL — SIGNIFICANT CHANGE UP (ref 7–13)
POTASSIUM SERPL-MCNC: 4.2 MMOL/L — SIGNIFICANT CHANGE UP (ref 3.5–5.3)
POTASSIUM SERPL-SCNC: 4.2 MMOL/L — SIGNIFICANT CHANGE UP (ref 3.5–5.3)
RBC # BLD: 5.83 M/UL — HIGH (ref 4.2–5.8)
RBC # FLD: 14.8 % — HIGH (ref 10.3–14.5)
SODIUM SERPL-SCNC: 136 MMOL/L — SIGNIFICANT CHANGE UP (ref 135–145)
WBC # BLD: 5.65 K/UL — SIGNIFICANT CHANGE UP (ref 3.8–10.5)
WBC # FLD AUTO: 5.65 K/UL — SIGNIFICANT CHANGE UP (ref 3.8–10.5)

## 2017-10-30 PROCEDURE — 99239 HOSP IP/OBS DSCHRG MGMT >30: CPT

## 2017-10-30 RX ORDER — METHOTREXATE 2.5 MG/1
0 TABLET ORAL
Qty: 0 | Refills: 0 | COMMUNITY

## 2017-10-30 RX ORDER — ACETAMINOPHEN 500 MG
2 TABLET ORAL
Qty: 0 | Refills: 0 | COMMUNITY
Start: 2017-10-30

## 2017-10-30 RX ORDER — OXYCODONE HYDROCHLORIDE 5 MG/1
1 TABLET ORAL
Qty: 0 | Refills: 0 | COMMUNITY
Start: 2017-10-30

## 2017-10-30 RX ORDER — ONDANSETRON 8 MG/1
4 TABLET, FILM COATED ORAL ONCE
Qty: 0 | Refills: 0 | Status: COMPLETED | OUTPATIENT
Start: 2017-10-30 | End: 2017-10-30

## 2017-10-30 RX ADMIN — Medication 1 MILLIGRAM(S): at 13:10

## 2017-10-30 RX ADMIN — Medication 1 PATCH: at 13:10

## 2017-10-30 RX ADMIN — ONDANSETRON 4 MILLIGRAM(S): 8 TABLET, FILM COATED ORAL at 01:08

## 2017-10-30 RX ADMIN — Medication 10 MILLIGRAM(S): at 06:14

## 2017-10-30 RX ADMIN — Medication 1 PATCH: at 13:08

## 2017-10-30 NOTE — PROGRESS NOTE ADULT - PROBLEM SELECTOR PLAN 1
- Drug rxn vs. psoriatic arthritis.  - Interval improvement of r. wrist despite d/c of abx.  - Rheumatology not recommending intervention at this time.  - Mildly leukopenic, no other signs/symptoms of sepsis including fever in the last 48 hours.  - Previous MRI had shown cellulitis, with tenosynovitis and synovitis on previous admission. Would not follow up imaging in light of patient improvement. - Drug rxn vs. psoriatic arthritis.  - Interval improvement of r. wrist despite d/c of abx.  - Rheumatology not recommending intervention at this time.  - Mildly leukopenic, no other signs/symptoms of sepsis including fever in the last 48 hours.  - Previous MRI had shown cellulitis, with tenosynovitis and synovitis on previous admission. Would not follow up imaging in light of patient improvement.  -Close follow up after discharge.

## 2017-10-30 NOTE — PROGRESS NOTE ADULT - PROBLEM SELECTOR PLAN 4
- Holding pharmacologic AC as Improve score is 0. Patient is ambulatory at baseline. Will encourage ambulation.    Leonardo Schaffer MD  Medicine Team 4  # [id number]  Please page [phone] if after 7:00 PM on weekdays or after 12 PM on weekends. - Holding pharmacologic AC as Improve score is 0. Patient is ambulatory at baseline. Will encourage ambulation.    # Dispo: Discharge today pending PICC line removal. Encouraged patient to schedule OP appointments at this time.  Leonardo Schaffer MD  Medicine Team 4  # [id number]  Please page [phone] if after 7:00 PM on weekdays or after 12 PM on weekends. - Holding pharmacologic AC as Improve score is 0. Patient is ambulatory at baseline. Will encourage ambulation.    # Dispo: Discharge today pending PICC line removal. Encouraged patient to schedule OP appointments at this time.    Leonardo Schaffer MD  Medicine Team 4  # [id number]  Please page [phone] if after 7:00 PM on weekdays or after 12 PM on weekends.

## 2017-10-30 NOTE — PROGRESS NOTE ADULT - PROBLEM SELECTOR PLAN 3
- C/w cream for psoriasis.   - Plan for close follow-up with Rheumatology after discharge. Restart MTX on OP rheumatology follow up. - c/w Lidex 0.05% ointment.  - Plan for close follow-up with Rheumatology after discharge. Restart MTX on OP rheumatology follow up.  - LFTS elevated on this admission. - c/w Lidex 0.05% ointment.  - Plan for close follow-up with Rheumatology after discharge. Restart MTX on OP rheumatology follow up.  - LFTS elevated on this admission, continue to trend.

## 2017-10-30 NOTE — PROGRESS NOTE ADULT - PROBLEM SELECTOR PLAN 2
- C/w home Enalapril with hand parameters  - Monitor BP - C/w home Enalapril with parameters.  - BP well controlled.

## 2017-10-30 NOTE — PROGRESS NOTE ADULT - ATTENDING COMMENTS
patient seen and examined by bedside by myself, case discussed with resident, agree with the above finding and plan   Patient feels better, wants to go home, last fever at 4:13 pm yesterday , blood cx negative for 24 hrs, ID f/u noted, no need for more Abx   Rheumatology eval requested, if no intervention recommended by rheumatology and pt afebrile, will plan for discharge today
Patient seen and examined. Currently, no complaints. Wrist pain much improved since admission, which is likely due to acute psoriatic arthritis flare. PICC line pulled today. Medically stable for discharge home. F/u with rheumatology regarding resuming MTX.    DISCHARGE TIME- 36 MINUTES SPENT PREPARING DISCHARGE, INCLUDING PAPERWORK, MEDICATION RECONCILIATION, AND COUNSELLING OF PATIENT.

## 2017-10-30 NOTE — PROGRESS NOTE ADULT - SUBJECTIVE AND OBJECTIVE BOX
Patient is a 52y old  Male who presents with a chief complaint of Fevers (29 Oct 2017 19:13)    SUBJECTIVE / OVERNIGHT EVENTS:    MEDICATIONS  (STANDING):  enalapril 10 milliGRAM(s) Oral daily  folic acid 1 milliGRAM(s) Oral daily  nicotine - 21 mG/24Hr(s) Patch 1 patch Transdermal daily  sodium chloride 0.9%. 1000 milliLiter(s) (75 mL/Hr) IV Continuous <Continuous>    MEDICATIONS  (PRN):  acetaminophen   Tablet 650 milliGRAM(s) Oral every 6 hours PRN For Temp greater than 38 C (100.4 F)  fluocinonide 0.05% Ointment 1 Application(s) Topical daily PRN For flare-ups  melatonin 3 milliGRAM(s) Oral at bedtime PRN Insomnia  oxyCODONE    IR 5 milliGRAM(s) Oral every 6 hours PRN Severe Pain (7 - 10)    I&O's Summary    PHYSICAL EXAM:  GENERAL: NAD, well-developed  HEAD:  Atraumatic, Normocephalic  EYES: EOMI, PERRLA, conjunctiva and sclera clear  NECK: Supple, No JVD  CHEST/LUNG: Clear to auscultation bilaterally; No wheeze  HEART: Regular rate and rhythm; No murmurs, rubs, or gallops  ABDOMEN: Soft, Nontender, Nondistended; Bowel sounds present  EXTREMITIES:  2+ Peripheral Pulses, No clubbing, cyanosis, or edema  PSYCH: AAOx3  NEUROLOGY: non-focal  SKIN: No rashes or lesions    LABS:                        13.4   3.48  )-----------( 246      ( 29 Oct 2017 05:53 )             41.5     WBC Trend: 3.48<--, 3.31<--  10-29    135  |  100  |  7   ----------------------------<  112<H>  4.1   |  21<L>  |  0.82    Ca    8.2<L>      29 Oct 2017 05:53  Phos  2.9     10-29  Mg     2.0     10-29      Creatinine Trend: 0.82<--, 1.03<--, 0.89<--, 0.90<--, 0.99<--, 0.98<--        Urinalysis Basic - ( 28 Oct 2017 08:20 )    Color: YELLOW / Appearance: CLEAR / S.020 / pH: 6.5  Gluc: NEGATIVE / Ketone: NEGATIVE  / Bili: NEGATIVE / Urobili: NORMAL E.U.   Blood: NEGATIVE / Protein: 20 / Nitrite: NEGATIVE   Leuk Esterase: NEGATIVE / RBC: 0-2 / WBC 0-2   Sq Epi: OCC / Non Sq Epi: x / Bacteria: x          RADIOLOGY & ADDITIONAL TESTS:    Imaging Personally Reviewed:    Consultant(s) Notes Reviewed:      Care Discussed with Consultants/Other Providers: Patient is a 52y old  Male who presents with a chief complaint of Fevers (29 Oct 2017 19:13)    SUBJECTIVE / OVERNIGHT EVENTS:    Patient would like to go home. Significant improvement in right wrist swelling and ROM per patient. No current restrictions of ROM and patient able to make a complete fist. Still with a PICC, will remove today.    Follows with Dr. Abraham as his rheumatologist OP. Patient would like to follow with Dr. Estrada (seeing while in the hospital)    MEDICATIONS  (STANDING):  enalapril 10 milliGRAM(s) Oral daily  folic acid 1 milliGRAM(s) Oral daily  nicotine - 21 mG/24Hr(s) Patch 1 patch Transdermal daily  sodium chloride 0.9%. 1000 milliLiter(s) (75 mL/Hr) IV Continuous <Continuous>    MEDICATIONS  (PRN):  acetaminophen   Tablet 650 milliGRAM(s) Oral every 6 hours PRN For Temp greater than 38 C (100.4 F)  fluocinonide 0.05% Ointment 1 Application(s) Topical daily PRN For flare-ups  melatonin 3 milliGRAM(s) Oral at bedtime PRN Insomnia  oxyCODONE    IR 5 milliGRAM(s) Oral every 6 hours PRN Severe Pain (7 - 10)    I&O's Summary    PHYSICAL EXAM:  GENERAL: NAD, well-developed  HEAD:  Atraumatic, Normocephalic  EYES: EOMI, PERRLA, conjunctiva and sclera clear  NECK: Supple, No JVD  CHEST/LUNG: Clear to auscultation bilaterally; No wheeze  HEART: Regular rate and rhythm; No murmurs, rubs, or gallops  ABDOMEN: Soft, Nontender, Nondistended; Bowel sounds present  EXTREMITIES:  2+ Peripheral Pulses. Right wrist dorsal swelling, Full range of motion. Very mild pain. No other joint complaints or decreased range of motion at this time, including axial.  PSYCH: AAOx3  NEUROLOGY: non-focal  SKIN: No rashes or lesions    LABS:                        13.4   3.48  )-----------( 246      ( 29 Oct 2017 05:53 )             41.5     WBC Trend: 3.48<--, 3.31<--  10-29    135  |  100  |  7   ----------------------------<  112<H>  4.1   |  21<L>  |  0.82    Ca    8.2<L>      29 Oct 2017 05:53  Phos  2.9     10-  Mg     2.0     10-      Creatinine Trend: 0.82<--, 1.03<--, 0.89<--, 0.90<--, 0.99<--, 0.98<--    Urinalysis Basic - ( 28 Oct 2017 08:20 )    Color: YELLOW / Appearance: CLEAR / S.020 / pH: 6.5  Gluc: NEGATIVE / Ketone: NEGATIVE  / Bili: NEGATIVE / Urobili: NORMAL E.U.   Blood: NEGATIVE / Protein: 20 / Nitrite: NEGATIVE   Leuk Esterase: NEGATIVE / RBC: 0-2 / WBC 0-2   Sq Epi: OCC / Non Sq Epi: x / Bacteria: x      RADIOLOGY & ADDITIONAL TESTS:    Imaging Personally Reviewed:    Consultant(s) Notes Reviewed:      Care Discussed with Consultants/Other Providers: Patient is a 52y old  Male who presents with a chief complaint of Fevers (29 Oct 2017 19:13)    SUBJECTIVE / OVERNIGHT EVENTS:    No overnight events.    Patient would like to go home. Significant improvement in right wrist swelling and ROM per patient. No current restrictions of ROM and patient able to make a complete fist. Still with a PICC, will remove today.    Follows with Dr. Abraham as his rheumatologist OP. Patient would like to follow with Dr. Estrada (seeing while in the hospital)    ROS: Denies fever, chills, night sweats, joint pain or myalgia (mild right wrist only).    MEDICATIONS  (STANDING):  enalapril 10 milliGRAM(s) Oral daily  folic acid 1 milliGRAM(s) Oral daily  nicotine - 21 mG/24Hr(s) Patch 1 patch Transdermal daily  sodium chloride 0.9%. 1000 milliLiter(s) (75 mL/Hr) IV Continuous <Continuous>    MEDICATIONS  (PRN):  acetaminophen   Tablet 650 milliGRAM(s) Oral every 6 hours PRN For Temp greater than 38 C (100.4 F)  fluocinonide 0.05% Ointment 1 Application(s) Topical daily PRN For flare-ups  melatonin 3 milliGRAM(s) Oral at bedtime PRN Insomnia  oxyCODONE    IR 5 milliGRAM(s) Oral every 6 hours PRN Severe Pain (7 - 10)    I&O's Summary    PHYSICAL EXAM:  GENERAL: NAD, well-developed  HEAD:  Atraumatic, Normocephalic  NECK: Supple, No JVD, no appreciable cervical lymphadenopathy.  HEART: Regular rate and rhythm; No murmurs, rubs, or gallops  EXTREMITIES:  2+ Peripheral Pulses. Right wrist dorsal swelling, Full range of motion. Very mild pain. No other joint complaints or decreased range of motion at this time, including axial.    LABS:                        13.4   3.48  )-----------( 246      ( 29 Oct 2017 05:53 )             41.5     WBC Trend: 3.48<--, 3.31<--  10-    135  |  100  |  7   ----------------------------<  112<H>  4.1   |  21<L>  |  0.82    Ca    8.2<L>      29 Oct 2017 05:53  Phos  2.9     10-  Mg     2.0     10-29      Creatinine Trend: 0.82<--, 1.03<--, 0.89<--, 0.90<--, 0.99<--, 0.98<--    Urinalysis Basic - ( 28 Oct 2017 08:20 )    Color: YELLOW / Appearance: CLEAR / S.020 / pH: 6.5  Gluc: NEGATIVE / Ketone: NEGATIVE  / Bili: NEGATIVE / Urobili: NORMAL E.U.   Blood: NEGATIVE / Protein: 20 / Nitrite: NEGATIVE   Leuk Esterase: NEGATIVE / RBC: 0-2 / WBC 0-2   Sq Epi: OCC / Non Sq Epi: x / Bacteria: x      RADIOLOGY & ADDITIONAL TESTS:    Imaging Personally Reviewed:    Consultant(s) Notes Reviewed:    Rheumatology  Infectious Disease.    Care Discussed with Consultants/Other Providers:

## 2017-10-30 NOTE — PROGRESS NOTE ADULT - ASSESSMENT
51 yo M with hx of HTN and psoriatic arthritis, with recent admission d/c on 10/13 for sepsis 2/2 R. wrist cellulitis in setting of steroid injection, presents for persistent fevers and worsening R. wrist pain and swelling at home.
Likely drug fever to vanco. improved. afebrile and feels better.  Blood cultures no growth to date.  would have PIC removed.  no antibiotics needed.
51 yo M with hx of HTN and psoriatic arthritis, with recent admission d/c on 10/13 for sepsis 2/2 R. wrist cellulitis in setting of steroid injection, presents for persistent fevers and worsening R. wrist pain and swelling at home.

## 2017-11-01 ENCOUNTER — APPOINTMENT (OUTPATIENT)
Dept: INFECTIOUS DISEASE | Facility: CLINIC | Age: 52
End: 2017-11-01

## 2017-11-02 LAB
BACTERIA BLD CULT: SIGNIFICANT CHANGE UP
BACTERIA BLD CULT: SIGNIFICANT CHANGE UP

## 2017-12-13 NOTE — PROGRESS NOTE ADULT - SUBJECTIVE AND OBJECTIVE BOX
Follow Up:  fever    Inverval History/ROS:  feels better. no fever, chills. wrist better too.    Allergies  No Known Allergies        ANTIMICROBIALS:  none    OTHER MEDS:  acetaminophen   Tablet 650 milliGRAM(s) Oral every 6 hours PRN  enalapril 10 milliGRAM(s) Oral daily  fluocinonide 0.05% Ointment 1 Application(s) Topical daily PRN  folic acid 1 milliGRAM(s) Oral daily  nicotine - 21 mG/24Hr(s) Patch 1 patch Transdermal daily  oxyCODONE    IR 5 milliGRAM(s) Oral every 6 hours PRN  sodium chloride 0.9%. 1000 milliLiter(s) IV Continuous <Continuous>      Vital Signs Last 24 Hrs  T(C): 36.6 (29 Oct 2017 06:26), Max: 38.3 (28 Oct 2017 15:36)  T(F): 97.9 (29 Oct 2017 06:26), Max: 100.9 (28 Oct 2017 15:36)  HR: 87 (29 Oct 2017 06:26) (87 - 98)  BP: 119/84 (29 Oct 2017 06:26) (107/75 - 120/79)  BP(mean): --  RR: 17 (29 Oct 2017 06:26) (17 - 18)  SpO2: 100% (29 Oct 2017 06:26) (99% - 100%)    PHYSICAL EXAM:  General: [x ] non-toxic  HEAD/EYES: [ ] PERRL [x ] white sclera [ ] icterus  ENT:  [ ] normal [x ] supple [ ] thrush [ ] pharyngeal exudate  Cardiovascular:   [ ] murmur [x ] normal [ ] PPM/AICD  Respiratory:  [x ] clear to ausculation bilaterally  GI:  [x ] soft, non-tender, normal bowel sounds  :  [ ] mabry [ ] no CVA tenderness   Musculoskeletal:  right wrist bony changes no erythema effusion  Neurologic:  [ ] non-focal exam   Skin:  dry scaly skin  Lymph: [ ] no lymphadenopathy  Psychiatric:  [ ] appropriate affect [ ] alert & oriented  Lines:   no phlebitis right antecubital                                13.4   3.48  )-----------( 246      ( 29 Oct 2017 05:53 )             41.5       10    135  |  100  |  7   ----------------------------<  112<H>  4.1   |  21<L>  |  0.82    Ca    8.2<L>      29 Oct 2017 05:53  Phos  2.9     10-29  Mg     2.0     10-29    TPro  7.6  /  Alb  4.2  /  TBili  0.3  /  DBili  x   /  AST  40  /  ALT  61<H>  /  AlkPhos  70  10-28      Urinalysis Basic - ( 28 Oct 2017 08:20 )    Color: YELLOW / Appearance: CLEAR / S.020 / pH: 6.5  Gluc: NEGATIVE / Ketone: NEGATIVE  / Bili: NEGATIVE / Urobili: NORMAL E.U.   Blood: NEGATIVE / Protein: 20 / Nitrite: NEGATIVE   Leuk Esterase: NEGATIVE / RBC: 0-2 / WBC 0-2   Sq Epi: OCC / Non Sq Epi: x / Bacteria: x        MICROBIOLOGY:  v  BLOOD  10-28-17 --  --  --  no growth to date x 2      BLOOD PERIPHERAL  10-08-17 --  --  --      BLOOD VENOUS  10-08-17 --  --  --                RADIOLOGY: 22

## 2018-05-17 NOTE — ED PROVIDER NOTE - RESPIRATORY NEGATIVE STATEMENT, MLM
no chest pain, no cough, and no shortness of breath.
64 yo pt with bleeding from surgical wound, s/p robot assisted prostatectomy.  Plan labs, ct scan.

## 2019-11-20 NOTE — ED PROVIDER NOTE - ENMT NEGATIVE STATEMENT, MLM
Ears: no ear pain and no hearing problems.Nose: no nasal congestion and no nasal drainage.Mouth/Throat: no dysphagia, no hoarseness and no throat pain.Neck: no lumps, no pain, no stiffness and no swollen glands.
Declines

## 2021-04-26 NOTE — ED ADULT TRIAGE NOTE - NS ED TRIAGE AVPU SCALE

## 2022-05-09 NOTE — PROGRESS NOTE ADULT - PROBLEM SELECTOR PLAN 1
2/2 R wrist/hand cellulitis (pt w/ HR > 100 and leukocytosis). Xray R hand reviewed w/ no acute findings. ED attempted to drain area but not fluid to drain.   On  IV clindamycin. Blood cxs sent  however pt already on abx limiting yield even further   Pain improving.  Pain control w/ tylenol PRN. If tylenol not sufficient can add opiod for better control  Hand sx recs appreciated, no surgical intervention at this time  ID consult called PROCEDURES:  Laparoscopic cholecystectomy w cholangiography 09-May-2022 18:12:59  Amado Campbell

## 2025-03-04 NOTE — H&P ADULT - NSHPPOADEEPVENOUSTHROMB_GEN_A_CORE
no 89-year-old male with history of A-fib on Eliquis, prostate CA, DM presenting to the ED after biting his tongue at 6 PM while eating complaining of persistent oozing of blood.  Patient tried direct pressure.  Reports since 6 PM however he has had persistent oozing that has not let up.  He however has no difficulty breathing.  No lightheadedness, dizziness, shortness of breath.  No syncope or near syncope. 89-year-old male with history of A-fib on Eliquis, prostate CA, DM presenting to the ED after biting his tongue at 6 PM while eating complaining of persistent oozing of blood.  Patient tried direct pressure.  Reports since 6 PM however he has had persistent oozing that has not let up.  He however has no difficulty breathing.  No lightheadedness, dizziness, shortness of breath.  No syncope or near syncope.    Attendin-year-old male presents with bleeding from the tip of the tongue.  Patient states he bit it while he was eating around 6 PM in the evening.  Patient is on Eliquis.  He has been trying ice and pressure but unable to get the bleeding to stop.  No clots but the tongue has been losing.